# Patient Record
Sex: FEMALE | Race: NATIVE HAWAIIAN OR OTHER PACIFIC ISLANDER | NOT HISPANIC OR LATINO | Employment: PART TIME | URBAN - METROPOLITAN AREA
[De-identification: names, ages, dates, MRNs, and addresses within clinical notes are randomized per-mention and may not be internally consistent; named-entity substitution may affect disease eponyms.]

---

## 2019-02-23 ENCOUNTER — HOSPITAL ENCOUNTER (EMERGENCY)
Facility: MEDICAL CENTER | Age: 24
End: 2019-02-23
Attending: EMERGENCY MEDICINE
Payer: OTHER MISCELLANEOUS

## 2019-02-23 VITALS
DIASTOLIC BLOOD PRESSURE: 81 MMHG | HEART RATE: 95 BPM | WEIGHT: 182.98 LBS | TEMPERATURE: 97 F | OXYGEN SATURATION: 98 % | HEIGHT: 69 IN | SYSTOLIC BLOOD PRESSURE: 125 MMHG | RESPIRATION RATE: 16 BRPM | BODY MASS INDEX: 27.1 KG/M2

## 2019-02-23 DIAGNOSIS — S01.512A INTRAORAL LACERATION, INITIAL ENCOUNTER: ICD-10-CM

## 2019-02-23 PROCEDURE — 700101 HCHG RX REV CODE 250: Performed by: ORAL & MAXILLOFACIAL SURGERY

## 2019-02-23 PROCEDURE — 99284 EMERGENCY DEPT VISIT MOD MDM: CPT

## 2019-02-23 RX ORDER — LIDOCAINE HYDROCHLORIDE AND EPINEPHRINE BITARTRATE 20; .01 MG/ML; MG/ML
10 INJECTION, SOLUTION SUBCUTANEOUS ONCE
Status: COMPLETED | OUTPATIENT
Start: 2019-02-23 | End: 2019-02-23

## 2019-02-23 RX ADMIN — LIDOCAINE HYDROCHLORIDE AND EPINEPHRINE 10 ML: 20; 10 INJECTION, SOLUTION INFILTRATION; PERINEURAL at 14:24

## 2019-02-23 ASSESSMENT — LIFESTYLE VARIABLES: DO YOU DRINK ALCOHOL: NO
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
## 2019-02-23 NOTE — ED NOTES
Discharge instructions given to pt including follow up with oral surgeon or returning if no improvement of symptoms or to return if worse. Prescription x 1 provided to pt by Dr. Lin and instructed no driving no drinking while on prescribed pain medication. Questions answered by RN. Denies any new complaints. Discharged w/stable vitals and able to ambulate w/steady gait. Mouth laceration repaired by Dr. Lin.
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
## 2022-11-08 ENCOUNTER — HOSPITAL ENCOUNTER (OUTPATIENT)
Dept: RADIOLOGY | Facility: MEDICAL CENTER | Age: 27
End: 2022-11-08
Payer: COMMERCIAL

## 2022-11-08 ENCOUNTER — OFFICE VISIT (OUTPATIENT)
Dept: URGENT CARE | Facility: PHYSICIAN GROUP | Age: 27
End: 2022-11-08
Payer: COMMERCIAL

## 2022-11-08 VITALS
RESPIRATION RATE: 14 BRPM | HEIGHT: 67 IN | TEMPERATURE: 97.6 F | SYSTOLIC BLOOD PRESSURE: 122 MMHG | BODY MASS INDEX: 31.39 KG/M2 | HEART RATE: 102 BPM | OXYGEN SATURATION: 100 % | WEIGHT: 200 LBS | DIASTOLIC BLOOD PRESSURE: 76 MMHG

## 2022-11-08 DIAGNOSIS — J18.9 COMMUNITY ACQUIRED BILATERAL LOWER LOBE PNEUMONIA: ICD-10-CM

## 2022-11-08 DIAGNOSIS — R05.9 COUGH, UNSPECIFIED TYPE: ICD-10-CM

## 2022-11-08 PROCEDURE — 99214 OFFICE O/P EST MOD 30 MIN: CPT

## 2022-11-08 PROCEDURE — 71046 X-RAY EXAM CHEST 2 VIEWS: CPT

## 2022-11-08 RX ORDER — BENZONATATE 100 MG/1
100 CAPSULE ORAL 3 TIMES DAILY PRN
Qty: 60 CAPSULE | Refills: 0 | Status: SHIPPED | OUTPATIENT
Start: 2022-11-08 | End: 2023-03-06 | Stop reason: SDUPTHER

## 2022-11-08 RX ORDER — AMOXICILLIN 500 MG/1
1000 CAPSULE ORAL 3 TIMES DAILY
Qty: 30 CAPSULE | Refills: 0 | Status: SHIPPED | OUTPATIENT
Start: 2022-11-08 | End: 2022-11-13

## 2022-11-08 RX ORDER — AMOXICILLIN 500 MG/1
500 CAPSULE ORAL 3 TIMES DAILY
Qty: 15 CAPSULE | Refills: 0 | Status: SHIPPED | OUTPATIENT
Start: 2022-11-08 | End: 2022-11-08

## 2022-11-08 RX ORDER — AZITHROMYCIN 250 MG/1
TABLET, FILM COATED ORAL
Qty: 6 TABLET | Refills: 0 | Status: SHIPPED | OUTPATIENT
Start: 2022-11-08 | End: 2023-03-06

## 2022-11-08 ASSESSMENT — ENCOUNTER SYMPTOMS
SORE THROAT: 0
FEVER: 0
SHORTNESS OF BREATH: 1
WEIGHT LOSS: 0
COUGH: 1
DIAPHORESIS: 0
SINUS PAIN: 0
MYALGIAS: 0
HEMOPTYSIS: 0
SPUTUM PRODUCTION: 1
WHEEZING: 1
CHILLS: 0

## 2022-11-08 NOTE — PROGRESS NOTES
"Subjective:   Ruel Guajardo is a 27 y.o. female who presents for Cough      HPI: This is a 27-year-old female who presents today for cough.  Patient reports productive cough for \"months \".  She does report that she has chronic cough ever since having bilateral lobe pneumonia in 2012.  She does report wheezing and shortness of breath that has recently occurred over the last few months.  She reports symptoms are worse while lying down.  No history of smoking.  She does report history of childhood asthma.  She denies sick contacts.  No fevers, chills, body aches.  She has not taken anything for her symptoms.  She denies having regular PCP follow-up.      Review of Systems   Constitutional:  Negative for chills, diaphoresis, fever, malaise/fatigue and weight loss.   HENT:  Negative for congestion, ear discharge, ear pain, sinus pain and sore throat.    Respiratory:  Positive for cough, sputum production, shortness of breath and wheezing. Negative for hemoptysis.    Musculoskeletal:  Negative for myalgias.     Medications:    No current outpatient medications on file prior to visit.     No current facility-administered medications on file prior to visit.        Allergies:   Patient has no known allergies.    Problem List:   There is no problem list on file for this patient.       Surgical History:  No past surgical history on file.    Past Social Hx:   Social History     Tobacco Use    Smoking status: Passive Smoke Exposure - Never Smoker   Substance Use Topics    Alcohol use: Yes     Comment: sometimes    Drug use: No          Problem list, medications, and allergies reviewed by myself today in Epic.     Objective:     /76 (BP Location: Left arm, Patient Position: Sitting, BP Cuff Size: Adult)   Pulse (!) 102   Temp 36.4 °C (97.6 °F) (Temporal)   Resp 14   Ht 1.702 m (5' 7\")   Wt 90.7 kg (200 lb)   SpO2 100%   BMI 31.32 kg/m²     Physical Exam  Vitals and nursing note reviewed. "   Constitutional:       General: She is awake. She is not in acute distress.     Appearance: Normal appearance. She is well-developed and normal weight. She is not ill-appearing, toxic-appearing or diaphoretic.   HENT:      Head: Normocephalic and atraumatic.      Right Ear: Tympanic membrane, ear canal and external ear normal. There is no impacted cerumen.      Left Ear: Tympanic membrane, ear canal and external ear normal. There is no impacted cerumen.      Nose: Nose normal. No congestion or rhinorrhea.      Mouth/Throat:      Mouth: Mucous membranes are moist.      Pharynx: Oropharynx is clear. No oropharyngeal exudate or posterior oropharyngeal erythema.   Cardiovascular:      Rate and Rhythm: Regular rhythm. Tachycardia present.      Pulses: Normal pulses.      Heart sounds: Normal heart sounds. No murmur heard.    No friction rub. No gallop.   Pulmonary:      Effort: Pulmonary effort is normal. No respiratory distress.      Breath sounds: No stridor. Wheezing present. No rhonchi or rales.   Chest:      Chest wall: No tenderness.   Musculoskeletal:      Cervical back: Neck supple. No tenderness.   Lymphadenopathy:      Cervical: No cervical adenopathy.   Skin:     General: Skin is warm and dry.      Capillary Refill: Capillary refill takes less than 2 seconds.   Neurological:      General: No focal deficit present.      Mental Status: She is alert and oriented to person, place, and time. Mental status is at baseline.      Cranial Nerves: No cranial nerve deficit.      Motor: No weakness.      Gait: Gait normal.   Psychiatric:         Mood and Affect: Mood normal.         Behavior: Behavior normal. Behavior is cooperative.         Thought Content: Thought content normal.         Judgment: Judgment normal.       Assessment/Plan:     Diagnosis and associated orders:   1. Community acquired bilateral lower lobe pneumonia  DX-CHEST-2 VIEWS    azithromycin (ZITHROMAX) 250 MG Tab    benzonatate (TESSALON) 100 MG Cap     amoxicillin (AMOXIL) 500 MG Cap    DISCONTINUED: amoxicillin (AMOXIL) 500 MG Cap       DX Chest:  IMPRESSION:  1.  Opacifications in the lower lungs are identified bilaterally, most prominent in the right middle lobe. Findings could be due to pneumonia.      Comments/MDM:   Pt is clinically stable at today's acute urgent care visit.  No acute distress noted. Appropriate for outpatient management at this time.     Patient is not ill or toxic appearing in clinic today.  Vital signs are stable, SPO2 100% on room air.  Dx chest showsOpacifications in the lower lungs are identified bilaterally, most prominent in the right middle lobe. Findings could be due to pneumonia.  I have personally reviewed x-rays.  Patient will be treated with amoxicillin 1000 mg TID x5 days and azithromycin 500 mg on day 1 then 250 mg for following days.  Have advised patient to begin taking antibiotics as prescribed, rest, increase oral hydration, use humidifier.  She is to return to  or go to ER for any new or worsening signs or symptoms, and if symptoms fail to improve in 48 hours. Urgent referral placed for PCP establishment.  Patient is agreeable and verbalizes good understanding of this today.  Work note provided.           Discussed DDx, management options (risks,benefits, and alternatives to planned treatment), natural progression and supportive care.  Expressed understanding and the treatment plan was agreed upon. Questions were encouraged and answered   Return to urgent care prn if new or worsening sx or if there is no improvement in condition prn.    Educated in Red flags and indications to immediately call 911 or present to the Emergency Department.   Advised the patient to follow-up with the primary care physician for recheck, reevaluation, and consideration of further management.    I personally reviewed prior external notes and test results pertinent to today's visit.  I have independently reviewed and interpreted all  diagnostics ordered during this urgent care acute visit.       Please note that this dictation was created using voice recognition software. I have made a reasonable attempt to correct obvious errors, but I expect that there are errors of grammar and possibly content that I did not discover before finalizing the note.    This note was electronically signed by REDD Mcclendon

## 2022-11-08 NOTE — LETTER
November 8, 2022    To Whom It May Concern:         This is confirmation that Ruel Crespo Tamekastefany attended her scheduled appointment with ISIAH Gonzalez on 11/08/22. Please excuse her from work 11/9/22-11/11/22.         If you have any questions please do not hesitate to call me at the phone number listed below.      Sincerely,          JOAQUIN Gonzalez.   Electronically signed  [phone]

## 2022-11-09 ENCOUNTER — TELEPHONE (OUTPATIENT)
Dept: SCHEDULING | Facility: IMAGING CENTER | Age: 27
End: 2022-11-09

## 2022-11-10 ENCOUNTER — OFFICE VISIT (OUTPATIENT)
Dept: MEDICAL GROUP | Facility: IMAGING CENTER | Age: 27
End: 2022-11-10
Payer: COMMERCIAL

## 2022-11-10 VITALS
TEMPERATURE: 98 F | SYSTOLIC BLOOD PRESSURE: 114 MMHG | RESPIRATION RATE: 16 BRPM | DIASTOLIC BLOOD PRESSURE: 70 MMHG | OXYGEN SATURATION: 98 % | HEART RATE: 85 BPM | BODY MASS INDEX: 31.52 KG/M2 | HEIGHT: 67 IN | WEIGHT: 200.8 LBS

## 2022-11-10 DIAGNOSIS — Z13.6 SCREENING FOR CARDIOVASCULAR CONDITION: ICD-10-CM

## 2022-11-10 DIAGNOSIS — Z13.79 GENETIC TESTING: ICD-10-CM

## 2022-11-10 DIAGNOSIS — E66.9 OBESITY (BMI 30-39.9): ICD-10-CM

## 2022-11-10 DIAGNOSIS — Z13.0 SCREENING FOR DEFICIENCY ANEMIA: ICD-10-CM

## 2022-11-10 DIAGNOSIS — J98.01 COUGH DUE TO BRONCHOSPASM: ICD-10-CM

## 2022-11-10 DIAGNOSIS — J45.20 MILD INTERMITTENT CHILDHOOD ASTHMA WITHOUT COMPLICATION: ICD-10-CM

## 2022-11-10 DIAGNOSIS — Z13.220 SCREENING FOR CHOLESTEROL LEVEL: ICD-10-CM

## 2022-11-10 DIAGNOSIS — Z13.1 DIABETES MELLITUS SCREENING: ICD-10-CM

## 2022-11-10 DIAGNOSIS — Z11.3 SCREENING EXAMINATION FOR SEXUALLY TRANSMITTED DISEASE: ICD-10-CM

## 2022-11-10 DIAGNOSIS — Z13.29 SCREENING FOR THYROID DISORDER: ICD-10-CM

## 2022-11-10 DIAGNOSIS — R05.3 CHRONIC COUGH: ICD-10-CM

## 2022-11-10 DIAGNOSIS — J18.9 PNEUMONIA OF BOTH LUNGS DUE TO INFECTIOUS ORGANISM, UNSPECIFIED PART OF LUNG: ICD-10-CM

## 2022-11-10 DIAGNOSIS — J45.20 INTERMITTENT ASTHMA WITHOUT COMPLICATION, UNSPECIFIED ASTHMA SEVERITY: ICD-10-CM

## 2022-11-10 PROBLEM — J45.909 CHILDHOOD ASTHMA WITHOUT COMPLICATION: Status: ACTIVE | Noted: 2022-11-10

## 2022-11-10 PROCEDURE — 99214 OFFICE O/P EST MOD 30 MIN: CPT

## 2022-11-10 RX ORDER — ALBUTEROL SULFATE 90 UG/1
2 AEROSOL, METERED RESPIRATORY (INHALATION) EVERY 4 HOURS PRN
Qty: 1 EACH | Refills: 11 | Status: SHIPPED | OUTPATIENT
Start: 2022-11-10 | End: 2023-12-05 | Stop reason: SDUPTHER

## 2022-11-10 ASSESSMENT — PAIN SCALES - GENERAL: PAINLEVEL: NO PAIN

## 2022-11-10 ASSESSMENT — PATIENT HEALTH QUESTIONNAIRE - PHQ9: CLINICAL INTERPRETATION OF PHQ2 SCORE: 0

## 2022-11-10 NOTE — PROGRESS NOTES
Chief Complaint   Patient presents with    Cough     Phlegmy cough,     Pneumonia     X2days, SOB        HISTORY OF THE PRESENT ILLNESS: Patient is a 27 y.o. female. This pleasant patient is here today to establish care and to discuss:    To establish care  No previous PCP    Pneumonia, bilateral lower lobes  Patient presented to  on 11/8 with symptoms of productive cough, shortness of breath, and wheezing. Patient was diagnosed with CAP, bilaterally and treated with Azithromycin and Amoxicillin. She continues to take her antibiotics as prescribed and is tolerating well without side effects.  Patient reports her symptoms are improving except her cough.  She is taking Tessalon Perles which does not help.   Denies fevers, chills, lethargy, fatigue, malaise, facial swelling, visual changes, weakness, elevated heart rate, stiff neck, prolonged cough, persistent wheezing, leg swelling, trouble breathing, persistent pain or pressure in the chest, confusion, inability to wake or stay awake, bluish lips or face, persistent tachycardia (fast heart rate), prolonged dizziness, or fainting.     Chronic cough  She admits that she has had chronic productive cough since 2012 after being hospitalized for bilateral pneumonia.  Patient states that she was told of having asthma during her middle school years but no further work up was done and did not require use of inhaler at the time.  She also has seasonal allergies and does not take any OTC antihistamine for this.     Obesity   Diet: admits that she does not eat as healthy as she should. She eats out regularly and drinks soda. She does not exercise; however, she stays active at work. She works at the [employer].   Patient admits to having family history of cardiovascular disease and diabetes.    Allergies: Patient has no known allergies.    Current Outpatient Medications Ordered in Epic   Medication Sig Dispense Refill    albuterol 108 (90 Base) MCG/ACT Aero Soln inhalation  "aerosol Inhale 2 Puffs every four hours as needed for Shortness of Breath. 1 Each 11    azithromycin (ZITHROMAX) 250 MG Tab Take 2 tablets by mouth on day one. Take one tablet by mouth daily for days 2-5 6 Tablet 0    benzonatate (TESSALON) 100 MG Cap Take 1 Capsule by mouth 3 times a day as needed for Cough. 60 Capsule 0    amoxicillin (AMOXIL) 500 MG Cap Take 2 Capsules by mouth 3 times a day for 5 days. 30 Capsule 0     No current Epic-ordered facility-administered medications on file.       History reviewed. No pertinent past medical history.    History reviewed. No pertinent surgical history.    Social History     Tobacco Use    Smoking status: Never     Passive exposure: Yes   Vaping Use    Vaping Use: Never used   Substance Use Topics    Alcohol use: Yes     Comment: sometimes    Drug use: No       Social History     Social History Narrative    Not on file       Family History   Problem Relation Age of Onset    Non-contributory Mother     Non-contributory Father     Heart Disease Other     Diabetes Other     Breast Cancer Neg Hx     Colorectal Cancer Neg Hx     Ovarian Cancer Neg Hx     Tubal Cancer Neg Hx     Peritoneal Cancer Neg Hx        ROS: per hpi    Gen: no fevers/chills  Pulm: no sob, no cough  CV: no chest pain, no palpitations, no edema  GI: no nausea/vomiting, no diarrhea  Skin: no rash    Exam: /70 (BP Location: Left arm, Patient Position: Sitting, BP Cuff Size: Adult)   Pulse 85   Temp 36.7 °C (98 °F) (Temporal)   Resp 16   Ht 1.702 m (5' 7\")   Wt 91.1 kg (200 lb 12.8 oz)   SpO2 98%  Body mass index is 31.45 kg/m².    Physical Exam:  Constitutional: Well-developed and well-nourished. Not diaphoretic. No distress.   Skin: Skin is warm and dry. No rash noted.  Head: Atraumatic without lesions.  Eyes: Conjunctivae and extraocular motions are normal. Pupils are equal, round, and reactive to light. No scleral icterus.   Ears:  External ears unremarkable. Tympanic membranes clear and " intact.  Nose: Nares patent. Septum midline. Turbinates without erythema nor edema. No discharge.   Mouth/Throat: Tongue normal. Oropharynx is clear and moist. Posterior pharynx without erythema or exudates.  Neck: Supple, trachea midline. Normal range of motion. No thyromegaly present. No lymphadenopathy--cervical or supraclavicular.  Cardiovascular: Regular rate and rhythm, S1 and S2 without murmur, rubs, or gallops.  Lungs: Normal inspiratory effort, diminished lung sounds with expiratory wheezing over bilateral posterior lower lung fields which improved after coughing. No rales or crackles.  Abdomen: Soft, non tender, and without distention. Active bowel sounds in all four quadrants. No rebound, guarding, masses or HSM.  Extremities: No cyanosis, clubbing, erythema, nor edema. Distal pulses intact and symmetric.   Musculoskeletal: All major joints AROM full in all directions without pain.  Neurological: Alert and oriented x 3. No cranial nerve deficit. 5/5 myotomes. Sensation intact.   Psychiatric:  Behavior, mood, and affect are appropriate.    Please note that this dictation was created using voice recognition software. I have made every reasonable attempt to correct obvious errors, but I expect that there are errors of grammar and possibly content that I did not discover before finalizing the note.      Assessment/Plan    27 y.o. female with the following -    1. Pneumonia of both lungs due to infectious organism, unspecified part of lung  New issue with symptoms improving.  Recommend to continue with antibiotics as prescribed.  Encouraged to increase fluid intake and plenty of rest. For worsening symptoms, advised to go to ER.    2. Cough due to bronchospasm  3. Chronic cough  5. Mild intermittent childhood asthma without complication  7. Intermittent asthma without complication, unspecified asthma severity  Established and ongoing condition.  Patient's presentation likely due to bronchospasm from asthma.   Discussed starting albuterol inhaler to use as needed for shortness of breath, wheezing, and coughing fits. Medication administration and side effects such as increased heart rate and nausea discussed.  Patient may continue to use Tessalon Perles as needed for cough.  Recommend PFT test to evaluate her asthma, patient agreeable to this.  Recommend for patient to start over-the-counter oral antihistamine such as Claritin, Allegra, or Zyrtec daily.  If symptoms do not improve, discussed starting inhaler with steroids for management of her asthma.  Patient agreeable to this.  ED symptoms discussed.    - albuterol 108 (90 Base) MCG/ACT Aero Soln inhalation aerosol; Inhale 2 Puffs every four hours as needed for Shortness of Breath.  Dispense: 1 Each; Refill: 11  - PULMONARY FUNCTION TESTS -Test requested: Complete Pulmonary Function Test; Future  - VITAMIN D,25 HYDROXY (DEFICIENCY); Future    4. Screening examination for sexually transmitted disease  Patient age high risk age group. She has not had STI screening in past. Patient agreeable to getting these done.    - Chlamydia/GC, PCR (Genital/Anal swab)  - HIV AG/AB Combo Assay Screening; Future  - T.Pallidum AB MARCELLUS (Screening); Future  - Trichomonas Vaginalis by TMA  - Hepatitis C Virus Antibody; Future  - HEP B Surface Antibody; Future  - Hep B Core AB Total; Future  - Hep B Surface Antigen; Future          6. Genetic testing    - Referral to Genetic Research Studies    8. Obesity (BMI 30-39.9)  9. Diabetes mellitus screening  10. Screening for cholesterol level  11. Screening for thyroid disorder  12. Screening for deficiency anemia  13. Screening for cardiovascular condition  Discussed lifestyle and dietary changes such as low-carb diet, high in vegetables and fresh fruits.  Encouraged to increase water intake.  Regular physical exercise at least 30 minutes/day 5 days a week. Weight reduction if applicable (aim for 5% to 10% body weight increments).   Patient with  family history of cardiovascular disease and diabetes.   Patient is at an increased risk for serious conditions such as heart disease, type 2 diabetes, BURTON, certain types of cancers, gallbladder disease, and circulation problems. Will order labs to rule out.      - CBC WITH DIFFERENTIAL; Future  - Comp Metabolic Panel; Future  - Lipid Profile; Future  - TSH WITH REFLEX TO FT4; Future  - VITAMIN D,25 HYDROXY (DEFICIENCY); Future  - HEMOGLOBIN A1C; Future    Medical Decision Making/Course:  In the course of preparing for this visit with review of the pertinent past medical history, recent and past clinic visits, current medications, and performing chart, immunization, medical history and medication reconciliation, and in the further course of obtaining the current history pertinent to the clinic visit today, performing an exam and evaluation, ordering and independently evaluating labs, tests, and/or procedures, prescribing any recommended new medications as noted above, providing any pertinent counseling and education and recommending further coordination of care. This was discussed with patient in a shared-decision making conversation, and they understand and agreed with plan of care.     Return in about 4 weeks (around 12/8/2022), or if symptoms worsen or fail to improve, for f/u labs.    Thank you, Carrol RAINEY  [facility]      Please note that this dictation was created using voice recognition software. I have made every reasonable attempt to correct obvious errors, but I expect that there are errors of grammar and possibly content that I did not discover before finalizing the note.

## 2023-03-06 ENCOUNTER — OFFICE VISIT (OUTPATIENT)
Dept: URGENT CARE | Facility: PHYSICIAN GROUP | Age: 28
End: 2023-03-06
Payer: COMMERCIAL

## 2023-03-06 ENCOUNTER — HOSPITAL ENCOUNTER (OUTPATIENT)
Dept: RADIOLOGY | Facility: MEDICAL CENTER | Age: 28
End: 2023-03-06
Payer: COMMERCIAL

## 2023-03-06 VITALS
WEIGHT: 200 LBS | HEART RATE: 105 BPM | HEIGHT: 67 IN | TEMPERATURE: 100 F | SYSTOLIC BLOOD PRESSURE: 128 MMHG | BODY MASS INDEX: 31.39 KG/M2 | DIASTOLIC BLOOD PRESSURE: 72 MMHG | RESPIRATION RATE: 14 BRPM | OXYGEN SATURATION: 96 %

## 2023-03-06 DIAGNOSIS — R50.9 FEVER, UNSPECIFIED FEVER CAUSE: ICD-10-CM

## 2023-03-06 DIAGNOSIS — J18.9 COMMUNITY ACQUIRED PNEUMONIA OF RIGHT MIDDLE LOBE OF LUNG: ICD-10-CM

## 2023-03-06 DIAGNOSIS — R06.2 WHEEZING: ICD-10-CM

## 2023-03-06 LAB
FLUAV RNA SPEC QL NAA+PROBE: NEGATIVE
FLUBV RNA SPEC QL NAA+PROBE: NEGATIVE
RSV RNA SPEC QL NAA+PROBE: NEGATIVE
SARS-COV-2 RNA RESP QL NAA+PROBE: NEGATIVE

## 2023-03-06 PROCEDURE — 71046 X-RAY EXAM CHEST 2 VIEWS: CPT

## 2023-03-06 PROCEDURE — 0241U POCT CEPHEID COV-2, FLU A/B, RSV - PCR: CPT

## 2023-03-06 PROCEDURE — 99214 OFFICE O/P EST MOD 30 MIN: CPT | Mod: 25,CS

## 2023-03-06 PROCEDURE — 94640 AIRWAY INHALATION TREATMENT: CPT

## 2023-03-06 RX ORDER — METHYLPREDNISOLONE 4 MG/1
TABLET ORAL
Qty: 21 TABLET | Refills: 0 | Status: SHIPPED | OUTPATIENT
Start: 2023-03-06 | End: 2023-03-16

## 2023-03-06 RX ORDER — AMOXICILLIN AND CLAVULANATE POTASSIUM 875; 125 MG/1; MG/1
1 TABLET, FILM COATED ORAL 2 TIMES DAILY
Qty: 14 TABLET | Refills: 0 | Status: SHIPPED | OUTPATIENT
Start: 2023-03-06 | End: 2023-03-13

## 2023-03-06 RX ORDER — DOXYCYCLINE HYCLATE 100 MG
100 TABLET ORAL 2 TIMES DAILY
Qty: 14 TABLET | Refills: 0 | Status: SHIPPED | OUTPATIENT
Start: 2023-03-06 | End: 2023-03-13

## 2023-03-06 RX ORDER — ALBUTEROL SULFATE 2.5 MG/3ML
2.5 SOLUTION RESPIRATORY (INHALATION) ONCE
Status: COMPLETED | OUTPATIENT
Start: 2023-03-06 | End: 2023-03-06

## 2023-03-06 RX ORDER — BENZONATATE 100 MG/1
100 CAPSULE ORAL 3 TIMES DAILY PRN
Qty: 60 CAPSULE | Refills: 0 | Status: SHIPPED | OUTPATIENT
Start: 2023-03-06 | End: 2023-12-05 | Stop reason: SDUPTHER

## 2023-03-06 RX ADMIN — ALBUTEROL SULFATE 2.5 MG: 2.5 SOLUTION RESPIRATORY (INHALATION) at 16:06

## 2023-03-06 NOTE — LETTER
March 6, 2023      To Whom It May Concern:    This is confirmation that Ruel Guajardo was seen in urgent care today for illness. She may return to work on Thursday 03/09/23. If you have any questions please do not hesitate to call me at the phone number listed below.    Sincerely,      Keysha Elaine, [email or web address].  [phone]

## 2023-03-06 NOTE — PROGRESS NOTES
"Subjective:   Ruel Guajardo is a 27 y.o. female who presents for Headache (Migraine for a couple days, hands and feet are cold. Chest feels tight which causes phlegm-y cough. Hard to eat because it feels tight going down. Body aches, some aches on L side of chest. )      HPI:    Patient presents to urgent care with her SO for concerns of recurrent pneumonia  Reports chest pain, fever, headache, body aches, sore throat.  Endorses productive cough  Denies dizziness, SOB  Symptoms started 2-3 days ago.   Reports treatment for CAP in November. Had follow up with PCP  States similar feeling to pneumonia.   Denies smoking, smoke exposure.       ROS As above in HPI    Medications:    Current Outpatient Medications on File Prior to Visit   Medication Sig Dispense Refill    albuterol 108 (90 Base) MCG/ACT Aero Soln inhalation aerosol Inhale 2 Puffs every four hours as needed for Shortness of Breath. 1 Each 11     No current facility-administered medications on file prior to visit.        Allergies:   Peanut-derived    Problem List:   Patient Active Problem List   Diagnosis    Pneumonia of both lower lobes    Chronic cough    Childhood asthma without complication    Obesity (BMI 30-39.9)        Surgical History:  No past surgical history on file.    Past Social Hx:   Social History     Tobacco Use    Smoking status: Never     Passive exposure: Yes    Smokeless tobacco: Never   Vaping Use    Vaping Use: Never used   Substance Use Topics    Alcohol use: Yes     Comment: sometimes    Drug use: No          Problem list, medications, and allergies reviewed by myself today in Epic.     Objective:     /72   Pulse (!) 105   Temp 37.8 °C (100 °F)   Resp 14   Ht 1.702 m (5' 7\")   Wt 90.7 kg (200 lb)   SpO2 96%   BMI 31.32 kg/m²     Physical Exam  Vitals reviewed.   Constitutional:       Appearance: Normal appearance.   HENT:      Head: Normocephalic and atraumatic.      Right Ear: Tympanic membrane and ear " canal normal.      Left Ear: Tympanic membrane and ear canal normal.      Nose: Congestion and rhinorrhea present. Rhinorrhea is clear.      Right Sinus: No maxillary sinus tenderness or frontal sinus tenderness.      Left Sinus: No maxillary sinus tenderness or frontal sinus tenderness.      Mouth/Throat:      Mouth: Mucous membranes are moist.      Pharynx: Oropharynx is clear. Uvula midline. Posterior oropharyngeal erythema present. No pharyngeal swelling or oropharyngeal exudate.      Tonsils: No tonsillar exudate.   Cardiovascular:      Rate and Rhythm: Regular rhythm. Tachycardia present.      Heart sounds: Normal heart sounds. No murmur heard.    No friction rub. No gallop.   Pulmonary:      Effort: Pulmonary effort is normal. No respiratory distress.      Breath sounds: No stridor. Examination of the right-upper field reveals decreased breath sounds. Examination of the left-upper field reveals decreased breath sounds. Examination of the right-middle field reveals decreased breath sounds and wheezing. Examination of the left-middle field reveals decreased breath sounds and wheezing. Examination of the right-lower field reveals decreased breath sounds and wheezing. Examination of the left-lower field reveals decreased breath sounds and wheezing. Decreased breath sounds and wheezing present. No rhonchi or rales.   Chest:      Chest wall: No tenderness.   Abdominal:      General: Bowel sounds are normal.      Palpations: Abdomen is soft.   Skin:     General: Skin is warm and dry.      Capillary Refill: Capillary refill takes less than 2 seconds.      Findings: No rash.   Neurological:      Mental Status: She is alert and oriented to person, place, and time.       DX-CHEST-2 VIEWS 03/06/2023    Narrative  3/6/2023 3:49 PM    HISTORY/REASON FOR EXAM: Cough.    TECHNIQUE/EXAM DESCRIPTION AND NUMBER OF VIEWS:  Two views of the chest.    COMPARISON:  11/8/2022.    FINDINGS:  Cardiomediastinal contours are  normal.    There is new lingula greater than middle lobe consolidation and there is a new small left pleural effusion versus costophrenic sulcus blunting from atelectasis    No right pleural space process is evident.    Impression  New lingular greater than middle lobe consolidation is concerning for pneumonia. There is small left pleural fluid versus atelectasis      Assessment/Plan:       Results for orders placed or performed in visit on 03/06/23   POCT CEPHEID COV-2, FLU A/B, RSV - PCR   Result Value Ref Range    SARS-CoV-2 by PCR Negative Negative, Invalid    Influenza virus A RNA Negative Negative, Invalid    Influenza virus B, PCR Negative Negative, Invalid    RSV, PCR Negative Negative, Invalid       Diagnosis and associated orders:   1. Fever, unspecified fever cause  - DX-CHEST-2 VIEWS; Future  - POCT CEPHEID COV-2, FLU A/B, RSV - PCR    2. Wheezing  - albuterol (PROVENTIL) 2.5mg/3ml nebulizer solution 2.5 mg    3. Community acquired pneumonia of right middle lobe of lung  - benzonatate (TESSALON) 100 MG Cap; Take 1 Capsule by mouth 3 times a day as needed for Cough.  Dispense: 60 Capsule; Refill: 0  - amoxicillin-clavulanate (AUGMENTIN) 875-125 MG Tab; Take 1 Tablet by mouth 2 times a day for 7 days.  Dispense: 14 Tablet; Refill: 0  - doxycycline (VIBRAMYCIN) 100 MG Tab; Take 1 Tablet by mouth 2 times a day for 7 days.  Dispense: 14 Tablet; Refill: 0  - methylPREDNISolone (MEDROL DOSEPAK) 4 MG Tablet Therapy Pack; Follow schedule on package instructions.  Dispense: 21 Tablet; Refill: 0  - Referral to Pulmonary and Sleep Medicine        Comments/MDM:     Per radiology impression, there is New lingular greater than middle lobe consolidation is concerning for pneumonia. There is small left pleural fluid versus atelectasis.  Patient will be started on Augmentin and Doxycycline for CAP. She was previously treated with Amoxil and Azithromycin for CAP on 11/8/22. She reports she was compliant with medication  administration and completed both antibiotics previously. Patient SpO2 is 94% during triage, which increased to 96% after albuterol nebulized treatment. Wheezing continues to be auscultated s/p nebulized treatments. Patient reports improvement in ease of breathing. Declined repeated treatment in office. Supportive measures encouraged: Rest, increased oral hydration, NSAIDs/tylenol as needed per package instructions, warm humidification, deep breathing exercises.  Patient referred to pulmonary for recurrent CAP. Strict return to ER precautions reviewed with patient and her SO. Patient verbalized understanding and consented to plan of care.        Please note that this dictation was created using voice recognition software. I have made a reasonable attempt to correct obvious errors, but I expect that there are errors of grammar and possibly content that I did not discover before finalizing the note.    This note was electronically signed by Keysha Elaine DNP

## 2023-03-16 ENCOUNTER — OFFICE VISIT (OUTPATIENT)
Dept: SLEEP MEDICINE | Facility: MEDICAL CENTER | Age: 28
End: 2023-03-16
Attending: STUDENT IN AN ORGANIZED HEALTH CARE EDUCATION/TRAINING PROGRAM
Payer: COMMERCIAL

## 2023-03-16 VITALS
HEIGHT: 67 IN | HEART RATE: 92 BPM | DIASTOLIC BLOOD PRESSURE: 72 MMHG | OXYGEN SATURATION: 95 % | WEIGHT: 208 LBS | SYSTOLIC BLOOD PRESSURE: 104 MMHG | BODY MASS INDEX: 32.65 KG/M2

## 2023-03-16 DIAGNOSIS — J45.20 MILD INTERMITTENT ASTHMA WITHOUT COMPLICATION: ICD-10-CM

## 2023-03-16 PROCEDURE — 99204 OFFICE O/P NEW MOD 45 MIN: CPT | Performed by: STUDENT IN AN ORGANIZED HEALTH CARE EDUCATION/TRAINING PROGRAM

## 2023-03-16 PROCEDURE — 99212 OFFICE O/P EST SF 10 MIN: CPT | Performed by: STUDENT IN AN ORGANIZED HEALTH CARE EDUCATION/TRAINING PROGRAM

## 2023-03-16 RX ORDER — FLUTICASONE FUROATE 200 UG/1
1 POWDER RESPIRATORY (INHALATION) DAILY
Qty: 30 EACH | Refills: 3 | Status: SHIPPED | OUTPATIENT
Start: 2023-03-16 | End: 2023-06-15 | Stop reason: SDUPTHER

## 2023-03-16 ASSESSMENT — ENCOUNTER SYMPTOMS
VOMITING: 0
CHILLS: 0
SPUTUM PRODUCTION: 0
FOCAL WEAKNESS: 0
HEMOPTYSIS: 0
FEVER: 0
NAUSEA: 0
SHORTNESS OF BREATH: 0
EYE DISCHARGE: 0
COUGH: 0
WHEEZING: 0
FALLS: 0

## 2023-03-16 ASSESSMENT — PATIENT HEALTH QUESTIONNAIRE - PHQ9
SUM OF ALL RESPONSES TO PHQ QUESTIONS 1-9: 9
5. POOR APPETITE OR OVEREATING: 2 - MORE THAN HALF THE DAYS
CLINICAL INTERPRETATION OF PHQ2 SCORE: 1

## 2023-03-16 NOTE — PROGRESS NOTES
Pulmonary Clinic Note    Chief Complaint:  Chief Complaint   Patient presents with    Establish Care     Referred by Keysha RAINEY for  Community acquired pneumonia of right middle lobe of lung    Other     CXR 03/06/23     HPI:   Ruel Guajardo is a very pleasant 27 y.o. female with history of childhood asthma who presents to pulmonary clinic for asthma.    Patient states she has had asthma since her childhood and has always been managed w/PRN albuterol w/o any complications. She had a pneumonia in 2012 and was doing ok until 11/2022 when she was noted to have a viral vs bacterial pneumonia and asthma exacerbation that improved w/abx and albuterol. Then again seen on 3/6/23 w/fever, cough, sore throat, body aches and CXR was c/f pulmonary infiltrates so she was treated w/abx. She feels much better now. Doesn't use albuterol even when she feels dyspneic bc it sometimes makes her chest feel tight.   She reports dyspnea and wheezing w/exposure to cold weather, environmental allergies, significant exertion.   No significant exposures.   No history of recurrent infections.  Never been hospitalized or intubated for her asthma.    Past Medical History:   Diagnosis Date    Cough     Shortness of breath     Sputum production     Wheezing        History reviewed. No pertinent surgical history.    Social History     Socioeconomic History    Marital status: Single     Spouse name: Not on file    Number of children: Not on file    Years of education: Not on file    Highest education level: Not on file   Occupational History    Not on file   Tobacco Use    Smoking status: Never     Passive exposure: Never    Smokeless tobacco: Never   Vaping Use    Vaping Use: Never used   Substance and Sexual Activity    Alcohol use: Yes     Comment: sometimes    Drug use: No    Sexual activity: Yes     Partners: Male     Birth control/protection: Condom   Other Topics Concern    Not on file   Social History Narrative     "Not on file     Social Determinants of Health     Financial Resource Strain: Not on file   Food Insecurity: Not on file   Transportation Needs: Not on file   Physical Activity: Not on file   Stress: Not on file   Social Connections: Not on file   Intimate Partner Violence: Not on file   Housing Stability: Not on file          Family History   Problem Relation Age of Onset    Non-contributory Mother     Non-contributory Father     Heart Disease Other     Diabetes Other     Breast Cancer Neg Hx     Colorectal Cancer Neg Hx     Ovarian Cancer Neg Hx     Tubal Cancer Neg Hx     Peritoneal Cancer Neg Hx        Current Outpatient Medications on File Prior to Visit   Medication Sig Dispense Refill    benzonatate (TESSALON) 100 MG Cap Take 1 Capsule by mouth 3 times a day as needed for Cough. 60 Capsule 0    albuterol 108 (90 Base) MCG/ACT Aero Soln inhalation aerosol Inhale 2 Puffs every four hours as needed for Shortness of Breath. 1 Each 11     No current facility-administered medications on file prior to visit.       Allergies: Peanut-derived      ROS:   Review of Systems   Constitutional:  Negative for chills and fever.   HENT:  Negative for hearing loss.    Eyes:  Negative for discharge.   Respiratory:  Negative for cough, hemoptysis, sputum production, shortness of breath and wheezing.    Cardiovascular:  Negative for chest pain.   Gastrointestinal:  Negative for nausea and vomiting.   Musculoskeletal:  Negative for falls.   Skin:  Negative for rash.   Neurological:  Negative for focal weakness.     Vitals:  /72 (BP Location: Left arm, Patient Position: Sitting, BP Cuff Size: Adult)   Pulse 92   Ht 1.702 m (5' 7\")   Wt 94.3 kg (208 lb)   SpO2 95%     Physical Exam:  Physical Exam  Vitals and nursing note reviewed.   Constitutional:       General: She is not in acute distress.     Appearance: Normal appearance. She is not ill-appearing or toxic-appearing.   HENT:      Head: Normocephalic and atraumatic.      " Nose: Nose normal.      Mouth/Throat:      Mouth: Mucous membranes are moist.   Eyes:      General: No scleral icterus.     Conjunctiva/sclera: Conjunctivae normal.   Cardiovascular:      Rate and Rhythm: Normal rate and regular rhythm.   Pulmonary:      Effort: Pulmonary effort is normal. No respiratory distress.      Breath sounds: No wheezing, rhonchi or rales.   Abdominal:      Palpations: Abdomen is soft.   Musculoskeletal:         General: No deformity or signs of injury. Normal range of motion.      Cervical back: Normal range of motion.   Skin:     General: Skin is warm and dry.   Neurological:      General: No focal deficit present.      Mental Status: She is alert. Mental status is at baseline.   Psychiatric:         Mood and Affect: Mood normal.         Behavior: Behavior normal.       Data:    Reviewed     Assessment/Plan:    Problem List Items Addressed This Visit       Mild intermittent asthma without complication     MMRC 1. Last exacerbation in 11/2022.   Still w/sypmtoms but hesitant to use her albuterol bc it made her chest feel tight before when she was sick    - start ICS daily  - PRN albuterol to use as rescue inhaler  - PFTs  - CBC w/diff and IgE         Relevant Medications    Fluticasone Furoate (ARNUITY ELLIPTA) 200 MCG/ACT AEROSOL POWDER, BREATH ACTIVATED    Other Relevant Orders    IGE SERUM    CBC WITH DIFFERENTIAL    PULMONARY FUNCTION TESTS -Test requested: Complete Pulmonary Function Test       Return in about 3 months (around 6/16/2023).     This note was generated using voice recognition software which has a chance of producing errors of grammar and possibly content.  I have made every reasonable attempt to find and correct any obvious errors, but it should be expected that some may not be found prior to finalization of this note.    Time spent in record review prior to patient arrival, reviewing results, and in face-to-face encounter totaled 45 min, excluding any procedures if  performed.    Belkis Hua MD  Pulmonary and Critical Care Medicine  [facility]

## 2023-03-16 NOTE — ASSESSMENT & PLAN NOTE
MMRC 1. Last exacerbation in 11/2022.   Still w/sypmtoms but hesitant to use her albuterol bc it made her chest feel tight before when she was sick    - start ICS daily  - PRN albuterol to use as rescue inhaler  - PFTs  - CBC w/diff and IgE

## 2023-04-06 NOTE — PROGRESS NOTES
Pulmonary Clinic Note    Date of Visit: 4/19/2023     Chief Complaint:  No chief complaint on file.    HPI:   Ruel Guajardo is a very pleasant 27 y.o. year old female never smoker, with a PMHx of asthma, elevated BMI who presented to the Pulmonary Clinic for a regular follow up. Last seen in the office on 3/16/2023 with Dr. Hua.     Patient is followed by the pulmonary office for asthma.  PFTs in 2023 show ***.   Patient is a history of childhood asthma which has been managed by albuterol as needed and has not had any complications.  She did have pneumonia in 2012 and was stable until November 2022 when she was diagnosed with an asthma exacerbation with viral versus bacterial pneumonia that improved with antibiotics and albuterol.  She again was diagnosed with pneumonia in March 2023 by CXR with pulmonary infiltrates and was treated with antibiotics.  She reports triggers being cold weather, environmental allergies and significant exertion.    3/16/2023-  Patient was started on Arnuity and albuterol as needed.  IgE, CBC with differential and pulmonary function tests were ordered.    4/19/2023-  ***      Exacerbations this year:    Current medication regimen:    Oxygen use:    MMRC Grade:   0- Breathless only during strenuous exercise  1- Short of breath when hurrying or going up a small hill  2- Walks slower than friends due to breathlessness, has to stop at own pace  3- Stops to catch breath on level ground after 100m  4- Breathless with ambulating around house or ADLs        Past Medical History:   Diagnosis Date    Cough     Shortness of breath     Sputum production     Wheezing      No past surgical history on file.  Social History     Socioeconomic History    Marital status: Single     Spouse name: Not on file    Number of children: Not on file    Years of education: Not on file    Highest education level: Not on file   Occupational History    Not on file   Tobacco Use    Smoking status:  Never     Passive exposure: Never    Smokeless tobacco: Never   Vaping Use    Vaping Use: Never used   Substance and Sexual Activity    Alcohol use: Yes     Comment: sometimes    Drug use: No    Sexual activity: Yes     Partners: Male     Birth control/protection: Condom   Other Topics Concern    Not on file   Social History Narrative    Not on file     Social Determinants of Health     Financial Resource Strain: Not on file   Food Insecurity: Not on file   Transportation Needs: Not on file   Physical Activity: Not on file   Stress: Not on file   Social Connections: Not on file   Intimate Partner Violence: Not on file   Housing Stability: Not on file        Family History   Problem Relation Age of Onset    Non-contributory Mother     Non-contributory Father     Heart Disease Other     Diabetes Other     Breast Cancer Neg Hx     Colorectal Cancer Neg Hx     Ovarian Cancer Neg Hx     Tubal Cancer Neg Hx     Peritoneal Cancer Neg Hx      Current Outpatient Medications on File Prior to Visit   Medication Sig Dispense Refill    Fluticasone Furoate (ARNUITY ELLIPTA) 200 MCG/ACT AEROSOL POWDER, BREATH ACTIVATED Inhale 1 Puff every day. Rinse mouth after each use. 30 Each 3    benzonatate (TESSALON) 100 MG Cap Take 1 Capsule by mouth 3 times a day as needed for Cough. 60 Capsule 0    albuterol 108 (90 Base) MCG/ACT Aero Soln inhalation aerosol Inhale 2 Puffs every four hours as needed for Shortness of Breath. 1 Each 11     No current facility-administered medications on file prior to visit.     Allergies: Peanut-derived    ROS:   ROS    Vitals:  There were no vitals taken for this visit.    Physical Exam:  Physical Exam    Laboratory Data:  Labs: ***       PFTs: (Date: 4/12/2023)-  ***    Impression:  ***    CXR: (Date: 3/6/2023)-  Impression:  New lingular greater than middle lobe consolidation is concerning for pneumonia. There is small left pleural fluid versus atelectasis      Assessment and Plan:    Problem List Items  Addressed This Visit    None      Diagnostic studies have been reviewed with the patient.    No follow-ups on file.     This note was generated using voice recognition software which has a chance of producing errors of grammar and possibly content.  I have made every reasonable attempt to find and correct any obvious errors, but it should be expected that some may not be found prior to finalization of this note.    Time spent in record review prior to patient arrival, reviewing results, and in face-to-face encounter totaled *** min.  __________  REDD Smith  Pulmonary Medicine  [facility]

## 2023-04-12 ENCOUNTER — APPOINTMENT (OUTPATIENT)
Dept: SLEEP MEDICINE | Facility: MEDICAL CENTER | Age: 28
End: 2023-04-12
Attending: STUDENT IN AN ORGANIZED HEALTH CARE EDUCATION/TRAINING PROGRAM
Payer: COMMERCIAL

## 2023-04-19 ENCOUNTER — APPOINTMENT (OUTPATIENT)
Dept: SLEEP MEDICINE | Facility: MEDICAL CENTER | Age: 28
End: 2023-04-19
Payer: COMMERCIAL

## 2023-05-11 NOTE — PROGRESS NOTES
Pulmonary Clinic Note    Date of Visit: 5/11/2023     Chief Complaint:  No chief complaint on file.    HPI:   Ruel Guajardo is a very pleasant 27 y.o. year old female never smoker, with a PMHx of asthma, elevated BMI who presented to the Pulmonary Clinic for a regular follow up. Last seen in the office on 3/16/2023 with Dr. Hua.     Patient is followed by the pulmonary office for asthma.  There are no PFTs on file.  She currently uses albuterol.  In November 2022 where she was treated for viral versus bacterial pneumonia and asthma exacerbation.  Her most recent exacerbation was in March 2023 where she was treated with antibiotics for CXR that showed pulmonary infiltrates.  ***      Exacerbations this year:    Current medication regimen:    Oxygen use:    MMRC Grade:   0- Breathless only during strenuous exercise  1- Short of breath when hurrying or going up a small hill  2- Walks slower than friends due to breathlessness, has to stop at own pace  3- Stops to catch breath on level ground after 100m  4- Breathless with ambulating around house or ADLs    PFT ??  LABS??      Past Medical History:   Diagnosis Date    Cough     Shortness of breath     Sputum production     Wheezing      No past surgical history on file.  Social History     Socioeconomic History    Marital status: Single     Spouse name: Not on file    Number of children: Not on file    Years of education: Not on file    Highest education level: Not on file   Occupational History    Not on file   Tobacco Use    Smoking status: Never     Passive exposure: Never    Smokeless tobacco: Never   Vaping Use    Vaping Use: Never used   Substance and Sexual Activity    Alcohol use: Yes     Comment: sometimes    Drug use: No    Sexual activity: Yes     Partners: Male     Birth control/protection: Condom   Other Topics Concern    Not on file   Social History Narrative    Not on file     Social Determinants of Health     Financial Resource Strain:  Not on file   Food Insecurity: Not on file   Transportation Needs: Not on file   Physical Activity: Not on file   Stress: Not on file   Social Connections: Not on file   Intimate Partner Violence: Not on file   Housing Stability: Not on file        Family History   Problem Relation Age of Onset    Non-contributory Mother     Non-contributory Father     Heart Disease Other     Diabetes Other     Breast Cancer Neg Hx     Colorectal Cancer Neg Hx     Ovarian Cancer Neg Hx     Tubal Cancer Neg Hx     Peritoneal Cancer Neg Hx      Current Outpatient Medications on File Prior to Visit   Medication Sig Dispense Refill    Fluticasone Furoate (ARNUITY ELLIPTA) 200 MCG/ACT AEROSOL POWDER, BREATH ACTIVATED Inhale 1 Puff every day. Rinse mouth after each use. 30 Each 3    benzonatate (TESSALON) 100 MG Cap Take 1 Capsule by mouth 3 times a day as needed for Cough. 60 Capsule 0    albuterol 108 (90 Base) MCG/ACT Aero Soln inhalation aerosol Inhale 2 Puffs every four hours as needed for Shortness of Breath. 1 Each 11     No current facility-administered medications on file prior to visit.     Allergies: Peanut-derived    ROS:   ROS    Vitals:  There were no vitals taken for this visit.    Physical Exam:  Physical Exam    Laboratory Data:  PFTs-  None on file    CXR: (Date: 3/6/2023)-  Impression:  New lingular greater than middle lobe consolidation is concerning for pneumonia. There is small left pleural fluid versus atelectasis      Assessment and Plan:    Problem List Items Addressed This Visit    None      Diagnostic studies have been reviewed with the patient.    No follow-ups on file.     This note was generated using voice recognition software which has a chance of producing errors of grammar and possibly content.  I have made every reasonable attempt to find and correct any obvious errors, but it should be expected that some may not be found prior to finalization of this note.    Time spent in record review prior to patient  arrival, reviewing results, and in face-to-face encounter totaled *** min.  __________  REDD Smith  Pulmonary Medicine  [facility]

## 2023-05-25 ENCOUNTER — APPOINTMENT (OUTPATIENT)
Dept: SLEEP MEDICINE | Facility: MEDICAL CENTER | Age: 28
End: 2023-05-25
Payer: COMMERCIAL

## 2023-05-31 ENCOUNTER — NON-PROVIDER VISIT (OUTPATIENT)
Dept: SLEEP MEDICINE | Facility: MEDICAL CENTER | Age: 28
End: 2023-05-31
Attending: STUDENT IN AN ORGANIZED HEALTH CARE EDUCATION/TRAINING PROGRAM
Payer: COMMERCIAL

## 2023-05-31 VITALS — WEIGHT: 211.9 LBS | HEIGHT: 68 IN | BODY MASS INDEX: 32.12 KG/M2

## 2023-05-31 DIAGNOSIS — J45.20 MILD INTERMITTENT ASTHMA WITHOUT COMPLICATION: ICD-10-CM

## 2023-05-31 PROCEDURE — 94060 EVALUATION OF WHEEZING: CPT | Performed by: STUDENT IN AN ORGANIZED HEALTH CARE EDUCATION/TRAINING PROGRAM

## 2023-05-31 PROCEDURE — 94060 EVALUATION OF WHEEZING: CPT | Mod: 26 | Performed by: INTERNAL MEDICINE

## 2023-05-31 PROCEDURE — 94726 PLETHYSMOGRAPHY LUNG VOLUMES: CPT | Performed by: STUDENT IN AN ORGANIZED HEALTH CARE EDUCATION/TRAINING PROGRAM

## 2023-05-31 PROCEDURE — 94726 PLETHYSMOGRAPHY LUNG VOLUMES: CPT | Mod: 26 | Performed by: INTERNAL MEDICINE

## 2023-05-31 PROCEDURE — 94729 DIFFUSING CAPACITY: CPT | Performed by: STUDENT IN AN ORGANIZED HEALTH CARE EDUCATION/TRAINING PROGRAM

## 2023-05-31 PROCEDURE — 94729 DIFFUSING CAPACITY: CPT | Mod: 26 | Performed by: INTERNAL MEDICINE

## 2023-05-31 ASSESSMENT — PULMONARY FUNCTION TESTS
FEV1/FVC_PERCENT_PREDICTED: 93
FVC: 3.92
FEV1/FVC_PERCENT_PREDICTED: 85
FEV1/FVC: 80
FEV1_PREDICTED: 3.13
FEV1/FVC_PERCENT_CHANGE: 6
FEV1/FVC_PERCENT_PREDICTED: 93
FEV1_PERCENT_PREDICTED: 106
FVC_PREDICTED: 3.68
FEV1/FVC: 80
FEV1: 3.34
FVC_PERCENT_PREDICTED: 106
FEV1_PERCENT_PREDICTED: 99
FEV1/FVC_PERCENT_PREDICTED: 100
FEV1/FVC_PERCENT_PREDICTED: 99
FEV1/FVC_PERCENT_LLN: 71
FVC_LLN: 3.07
FEV1_LLN: 2.61
FEV1/FVC_PREDICTED: 86
FVC: 3.91
FEV1_PERCENT_CHANGE: 6
FEV1/FVC: 85
FEV1/FVC: 85.2
FEV1_PERCENT_CHANGE: 0
FEV1: 3.13
FVC_PERCENT_PREDICTED: 106

## 2023-05-31 NOTE — PROCEDURES
Tech: Yara Jose, RT  Good patient effort & cooperation.  Test was performed on the [employer] Body Plethysmograph- Elite DX system.  The predicted sets used for Spirometry are GLI-2012, for Lung Volumes are ITS, and for DLCO is GLI 2017.  The results of this test meet the ATS standards for acceptability and repeatability.  The DLCO was uncorrected for Hb.  A bronchodilator of Ventolin HFA 2 puffs via spacer was administered.  DLCO was performed during dilation period.    Interpretation:   Baseline spirometry shows normal airflows.  There is significant bronchodilator response.  Total lung capacity is normal however there is mild air trapping.  Diffusion capacity is elevated at 132% predicted.  Normal pulmonary function testing although air trapping with bronchodilator responsiveness and elevated DLCO is consistent with stated diagnosis of asthma.

## 2023-06-15 ENCOUNTER — OFFICE VISIT (OUTPATIENT)
Dept: SLEEP MEDICINE | Facility: MEDICAL CENTER | Age: 28
End: 2023-06-15
Attending: NURSE PRACTITIONER
Payer: COMMERCIAL

## 2023-06-15 VITALS
WEIGHT: 211 LBS | RESPIRATION RATE: 16 BRPM | HEIGHT: 67 IN | HEART RATE: 86 BPM | SYSTOLIC BLOOD PRESSURE: 114 MMHG | OXYGEN SATURATION: 98 % | DIASTOLIC BLOOD PRESSURE: 76 MMHG | BODY MASS INDEX: 33.12 KG/M2

## 2023-06-15 DIAGNOSIS — J45.20 MILD INTERMITTENT ASTHMA WITHOUT COMPLICATION: ICD-10-CM

## 2023-06-15 DIAGNOSIS — G47.30 SLEEP DISORDER BREATHING: ICD-10-CM

## 2023-06-15 PROCEDURE — 99212 OFFICE O/P EST SF 10 MIN: CPT | Performed by: NURSE PRACTITIONER

## 2023-06-15 PROCEDURE — 99214 OFFICE O/P EST MOD 30 MIN: CPT | Performed by: NURSE PRACTITIONER

## 2023-06-15 PROCEDURE — 3078F DIAST BP <80 MM HG: CPT | Performed by: NURSE PRACTITIONER

## 2023-06-15 PROCEDURE — 3074F SYST BP LT 130 MM HG: CPT | Performed by: NURSE PRACTITIONER

## 2023-06-15 RX ORDER — FLUTICASONE FUROATE 200 UG/1
1 POWDER RESPIRATORY (INHALATION) DAILY
Qty: 30 EACH | Refills: 3 | Status: SHIPPED | OUTPATIENT
Start: 2023-06-15 | End: 2023-12-05 | Stop reason: SDUPTHER

## 2023-06-15 NOTE — PROGRESS NOTES
Chief Complaint   Patient presents with    Follow-Up    Asthma       HPI:  Ruel Guajardo is a 27 y.o. year old female here today for follow-up on asthma with PFT results.  Last seen 3/16/2023 by Dr Hua.  Visit initially referred for CAP in right middle lobe of lung. Patient states she has had asthma since her childhood and has always been managed w/PRN albuterol w/o any complications. She had a pneumonia in 2012 and was doing ok until 11/2022 when she was noted to have a viral vs bacterial pneumonia and asthma exacerbation that improved w/abx and albuterol. Then again seen on 3/6/23 w/fever, cough, sore throat, body aches and CXR was c/f pulmonary infiltrates so she was treated w/abx.     She was started on Arnuity and using albuterol as needed.  Order was placed for PFTs and a CBC with differential and IgE.  Patient did not have blood work completed yet.  She states that she noticed improvement in her symptoms when starting immediately.  She is not frequently needing her rescue inhaler at this time.  She denies any current cough, wheezing, chest tightness, or new or worsening dyspnea or shortness of breath.  She does get dyspneic with exertion while at work.  She does work for [employer].    Also of significance, patient does note poor sleep quality, snoring, waking up gasping, and excessive daytime sleepiness and fatigue.  She has never tested for sleep apnea and would like to move forward with testing and referral.    PFT (5/31/2023):  Baseline spirometry shows normal airflows.  There is significant bronchodilator response.  Total lung capacity is normal however there is mild air trapping.  Diffusion capacity is elevated at 132% predicted.  Normal pulmonary function testing although air trapping with bronchodilator responsiveness and elevated DLCO is consistent with stated diagnosis of asthma.    ROS: As per HPI and otherwise negative if not stated.    Past Medical History:   Diagnosis Date    Cough   "   Shortness of breath     Sputum production     Wheezing        No past surgical history on file.    Family History   Problem Relation Age of Onset    Non-contributory Mother     Non-contributory Father     Heart Disease Other     Diabetes Other     Breast Cancer Neg Hx     Colorectal Cancer Neg Hx     Ovarian Cancer Neg Hx     Tubal Cancer Neg Hx     Peritoneal Cancer Neg Hx        Allergies as of 06/15/2023 - Reviewed 06/15/2023   Allergen Reaction Noted    Peanut-derived Anaphylaxis 03/06/2023        Vitals:  /76   Pulse 86   Resp 16   Ht 1.702 m (5' 7\")   Wt 95.7 kg (211 lb)   SpO2 98%     Current medications as of today   Current Outpatient Medications   Medication Sig Dispense Refill    Fluticasone Furoate (ARNUITY ELLIPTA) 200 MCG/ACT AEROSOL POWDER, BREATH ACTIVATED Inhale 1 Puff every day. Rinse mouth after each use. 30 Each 3    benzonatate (TESSALON) 100 MG Cap Take 1 Capsule by mouth 3 times a day as needed for Cough. 60 Capsule 0    albuterol 108 (90 Base) MCG/ACT Aero Soln inhalation aerosol Inhale 2 Puffs every four hours as needed for Shortness of Breath. 1 Each 11     No current facility-administered medications for this visit.         Physical Exam:   Gen:           Alert and oriented, No apparent distress. Mood and affect appropriate, normal interaction with examiner.  Eyes:          PERRL, EOM intact, sclere white, conjunctive moist.  Ears:          Not examined.   Hearing:     Grossly intact.  Nose:          Normal, no lesions or deformities.  Dentition:    Good dentition.  Oropharynx:   Tongue normal, posterior pharynx without erythema or exudate.  Neck:        Supple, trachea midline, no masses.  Respiratory Effort: No intercostal retractions or use of accessory muscles.   Lung Auscultation:      Clear to auscultation bilaterally; no rales, rhonchi or wheezing.  CV:            Regular rate and rhythm. No murmurs, rubs or gallops.  Abd:           Not examined.   Lymphadenopathy: Not " examined.  Gait and Station: Normal.  Digits and Nails: No clubbing, cyanosis, petechiae, or nodes.   Cranial Nerves: II-XII grossly intact.  Skin:        No rashes, lesions or ulcers noted.               Ext:           No cyanosis or edema.      Assessment:  1. Mild intermittent asthma without complication  Overnight Home Sleep Study    Fluticasone Furoate (ARNUITY ELLIPTA) 200 MCG/ACT AEROSOL POWDER, BREATH ACTIVATED      2. Sleep disorder breathing  Overnight Home Sleep Study    Referral to Pulmonary and Sleep Medicine          Plan:  Continue Arnuity.  Reviewed PFTs with patient.  Recommend continuing albuterol use as needed.  Patient will follow-up in 6 months.  Complains of poor sleep quality, snoring, waking up gasping, and daytime sleepiness and fatigue.  Will order home sleep study and refer to sleep clinic for evaluation.    Please note that this dictation was created using voice recognition software. I have made every reasonable attempt to correct obvious errors, but it is possible there are errors of grammar and possibly content that I did not discover before finalizing the note.

## 2023-06-24 ENCOUNTER — TELEPHONE (OUTPATIENT)
Dept: SCHEDULING | Facility: IMAGING CENTER | Age: 28
End: 2023-06-24
Payer: COMMERCIAL

## 2023-06-24 ENCOUNTER — TELEPHONE (OUTPATIENT)
Dept: HEALTH INFORMATION MANAGEMENT | Facility: OTHER | Age: 28
End: 2023-06-24
Payer: COMMERCIAL

## 2023-07-07 ENCOUNTER — HOME STUDY (OUTPATIENT)
Dept: SLEEP MEDICINE | Facility: MEDICAL CENTER | Age: 28
End: 2023-07-07
Attending: NURSE PRACTITIONER
Payer: COMMERCIAL

## 2023-07-07 DIAGNOSIS — G47.30 SLEEP DISORDER BREATHING: ICD-10-CM

## 2023-07-07 DIAGNOSIS — J45.20 MILD INTERMITTENT ASTHMA WITHOUT COMPLICATION: ICD-10-CM

## 2023-07-07 PROCEDURE — 95800 SLP STDY UNATTENDED: CPT | Performed by: STUDENT IN AN ORGANIZED HEALTH CARE EDUCATION/TRAINING PROGRAM

## 2023-07-19 NOTE — PROCEDURES
DIAGNOSTIC HOME SLEEP TEST (HST) REPORT WatchPAT      PATIENT ID:  NAME:  Ruel Guajardo  MRN:               [id number]  YOB: 1995  DATE OF STUDY: 7/10/2023      Impression:     This study shows evidence of:      1. Mild to Moderate obstructive sleep apnea with PAT apnea hypopnea index(pAHI) of 8.1 per hour.  PAT respiratory disturbance index (pRDI) was 16.2 per hour. These findings are based on 7 channels recording of PAT signal with sleep staging, heart rate, pulse oximetry, actigraphy, body position, snoring and respiratory movement.     2. Oxygenation O2 Sat. mean O2 sat was 96%,  kevon was 89%,  and maximum O2 at 99 %. O2 sat was at or  below 88% for 0 min of evaluation time. Oxygen Desaturation (>=4%) Index was 3/hr. AVG HR was 69 BPM.      TECHNICAL DESCRIPTION: Patient underwent home sleep apnea testing with peripheral arterial tone signal (WatchPAT™). This is a Type IV portable monitor and device per Medicare. Monitoring was done with 7 channels recording of PAT signal with sleep staging, heart rate, pulse oximetry, actigraphy, body position, snoring and respiratory movement. Prior to using the device, the patient received verbal and written instructions for its application and was provided with the help desk phone number for additional telephonic instruction with 24-hour availability of qualified personnel to answer questions.    Respiratory events:    pRDI: Total 64 (REM index 27.5/hr. NREM index 12.5/hr, All Night 16.2/hr)    3% pAHI: Total 32 (REM index 18.3/hr. NREM index 4.7/hr, All Night 8.1/hr)    3% pAHIc: Total 0 ( All Night 0/hr)    4% pAHI: Total 10 (All Night 2.5/hr)    General sleep summary: . Total recording time is 5 hours and 25 minutes and total Sleep time is 4 hours and 27 minutes. The patient spent 231 minutes in the supine position and 32.5 minutes in the nonsupine position.      Recommendations:    1. CPAP titration study vs Auto CPAP trial .   2.   In  general patients with sleep apnea are advised to avoid alcohol and sedatives and to not operate a motor vehicle while drowsy. In some cases alternative treatment options may prove effective in resolving sleep apnea in these options include upper airway surgery, the use of a dental orthotic or weight loss and positional therapy. Clinical correlation is required.         Bj Duke MD

## 2023-08-01 ENCOUNTER — OCCUPATIONAL MEDICINE (OUTPATIENT)
Dept: URGENT CARE | Facility: PHYSICIAN GROUP | Age: 28
End: 2023-08-01
Payer: COMMERCIAL

## 2023-08-01 ENCOUNTER — HOSPITAL ENCOUNTER (OUTPATIENT)
Dept: RADIOLOGY | Facility: MEDICAL CENTER | Age: 28
End: 2023-08-01
Attending: NURSE PRACTITIONER
Payer: COMMERCIAL

## 2023-08-01 VITALS
BODY MASS INDEX: 31.39 KG/M2 | OXYGEN SATURATION: 98 % | RESPIRATION RATE: 18 BRPM | SYSTOLIC BLOOD PRESSURE: 118 MMHG | WEIGHT: 200 LBS | DIASTOLIC BLOOD PRESSURE: 60 MMHG | TEMPERATURE: 97.2 F | HEART RATE: 87 BPM | HEIGHT: 67 IN

## 2023-08-01 DIAGNOSIS — S97.81XA CRUSHING INJURY OF RIGHT FOOT AND TOE, INITIAL ENCOUNTER: ICD-10-CM

## 2023-08-01 DIAGNOSIS — S97.101A CRUSHING INJURY OF RIGHT FOOT AND TOE, INITIAL ENCOUNTER: ICD-10-CM

## 2023-08-01 DIAGNOSIS — T36.95XA ANTIBIOTIC-INDUCED YEAST INFECTION: ICD-10-CM

## 2023-08-01 DIAGNOSIS — S92.501B OPEN FRACTURE OF PHALANX OF RIGHT FIFTH TOE, INITIAL ENCOUNTER: ICD-10-CM

## 2023-08-01 DIAGNOSIS — S91.114A LACERATION OF LESSER TOE OF RIGHT FOOT WITHOUT FOREIGN BODY PRESENT OR DAMAGE TO NAIL, INITIAL ENCOUNTER: ICD-10-CM

## 2023-08-01 DIAGNOSIS — B37.9 ANTIBIOTIC-INDUCED YEAST INFECTION: ICD-10-CM

## 2023-08-01 PROCEDURE — 73660 X-RAY EXAM OF TOE(S): CPT | Mod: RT

## 2023-08-01 PROCEDURE — 3078F DIAST BP <80 MM HG: CPT | Performed by: NURSE PRACTITIONER

## 2023-08-01 PROCEDURE — 3074F SYST BP LT 130 MM HG: CPT | Performed by: NURSE PRACTITIONER

## 2023-08-01 PROCEDURE — 99213 OFFICE O/P EST LOW 20 MIN: CPT | Mod: 29 | Performed by: NURSE PRACTITIONER

## 2023-08-01 RX ORDER — DOXYCYCLINE HYCLATE 100 MG
100 TABLET ORAL 2 TIMES DAILY
Qty: 10 TABLET | Refills: 0 | Status: SHIPPED | OUTPATIENT
Start: 2023-08-01 | End: 2023-08-06

## 2023-08-01 RX ORDER — FLUCONAZOLE 150 MG/1
TABLET ORAL
Qty: 2 TABLET | Refills: 0 | Status: SHIPPED | OUTPATIENT
Start: 2023-08-01 | End: 2023-12-05

## 2023-08-01 NOTE — PROGRESS NOTES
"Subjective:     Ruel Guajardo is a 27 y.o. female who presents for Laceration (Worker Comp New Rt pinky toe laceration)      DOI: 8/1/23 SHELLY: She was carrying a box and unfortunately the contents of the box which was a metal sheet did fall out of the box and hit the top of her right toe.  She states she immediately had pain.  She reports that there is now a laceration to the bottom of her right toe.  She states there is currently numbness and tingling.  Decreased range of motion.  She denies any other injury.  She denies any history of injury..  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    PMH:   No pertinent past medical history to this problem  MEDS:  Medications were reviewed in EMR  ALLERGIES:  Allergies were reviewed in EMR  FH:   No pertinent family history to this problem       Objective:     /60 (BP Location: Left arm, Patient Position: Sitting, BP Cuff Size: Adult)   Pulse 87   Temp 36.2 °C (97.2 °F) (Temporal)   Resp 18   Ht 1.702 m (5' 7\")   Wt 90.7 kg (200 lb)   SpO2 98%   BMI 31.32 kg/m²     Right foot: There is no swelling ecchymosis or bony tenderness to the right foot.  Right fifth toe there is swelling tenderness and tingling on the dorsal aspect.  The pulp of the right toe there is a laceration with irregular edges.  Actively bleeding. Laceration was cleansed, closed using steri strips.     Assessment/Plan:     Nontoxic-appearing 27-year-old female presenting clinic with a workplace injury crush injury of the right fifth toe as well as a laceration.  Did obtain x-ray which unfortunately did show a tuft fracture of the right fifth toe.  Distal neurovascular status grossly intact.  Laceration was cleansed copiously and closed with benzoin and Steri-Strips.  Patient did tolerate well.  Due to the location of the laceration being on the bottom of the toe did make patient nonweightbearing status.      1. Crushing injury of right foot and toe, initial encounter  - " DX-TOE(S) 2+ RIGHT; Future    2. Open fracture of phalanx of right fifth toe, initial encounter  - doxycycline (VIBRAMYCIN) 100 MG Tab; Take 1 Tablet by mouth 2 times a day for 5 days.  Dispense: 10 Tablet; Refill: 0    3. Laceration of lesser toe of right foot without foreign body present or damage to nail, initial encounter    4. Antibiotic-induced yeast infection  - fluconazole (DIFLUCAN) 150 MG tablet; Take one tablet orally for yeast infection, if symptoms persist, may repeat treatment after 72 hours.  Dispense: 2 Tablet; Refill: 0    Released to Restricted Duty FROM 8/1/2023 TO 8/6/2023  No use to right foot, must use crutches   Nondisplaced distal tuft fracture right 5th distal phalanx.    Differential diagnosis, natural history, supportive care, and indications for immediate follow-up discussed.

## 2023-08-01 NOTE — LETTER
[facility]  [address] [facility] [place] [address]  Phone:  [phone] - Fax:  [phone]   [facility] Progress Report and Disability Certification  Date of Service: 8/1/2023   No Show:  No  Date / Time of Next Visit: 8/6/2023   Claim Information   Patient Name: Ruel Guajardo  Claim Number:     Employer: [employer]  Date of Injury: 8/1/2023     Insurer / TPA: [employer] [place]  ID / SSN:     Occupation:   Diagnosis: Diagnoses of Crushing injury of right foot and toe, initial encounter, Open fracture of phalanx of right fifth toe, initial encounter, and Laceration of lesser toe of right foot without foreign body present or damage to nail, initial encounter were pertinent to this visit.    Medical Information   Related to Industrial Injury? Yes    Subjective Complaints:  DOI: 8/1/23 SHELLY: She was carrying a box and unfortunately the contents of the box which was a metal sheet did fall out of the box and hit the top of her right toe.  She states she immediately had pain.  She reports that there is now a laceration to the bottom of her right toe.  She states there is currently numbness and tingling.  Decreased range of motion.  She denies any other injury.  She denies any history of injury..  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   Objective Findings: Right foot: There is no swelling ecchymosis or bony tenderness to the right foot.  Right fifth toe there is swelling tenderness and tingling on the dorsal aspect.  The pulp of the right toe there is a laceration with irregular edges.  Actively bleeding. Laceration was cleansed, closed using steri strips.    Pre-Existing Condition(s):     Assessment:   Initial Visit    Status: Additional Care Required  Permanent Disability:No    Plan: Medication  Comments:antibioitcs for open fracture    Diagnostics: X-ray    Comments:  Nondisplaced distal tuft fracture right 5th distal phalanx.     Disability Information   Status: Released to Restricted Duty    From:  8/1/2023  Through: 8/6/2023 Restrictions are: Temporary   Physical Restrictions   Sitting:    Standing:    Stooping:    Bending:      Squatting:    Walking:    Climbing:    Pushing:      Pulling:    Other:    Reaching Above Shoulder (L):   Reaching Above Shoulder (R):       Reaching Below Shoulder (L):    Reaching Below Shoulder (R):      Not to exceed Weight Limits   Carrying(hrs):   Weight Limit(lb):   Lifting(hrs):   Weight  Limit(lb):     Comments: No use to right foot, must use crutches     Repetitive Actions   Hands: i.e. Fine Manipulations from Grasping:     Feet: i.e. Operating Foot Controls:     Driving / Operate Machinery:     Health Care Provider’s Original or Electronic Signature  JOSE ARMANDO ParrishRSnowNSnow Health Care Provider’s Original or Electronic Signature    Romel Tidwell DO MPH     Clinic Name / Location: [address] [address] Clinic Phone Number: Dept: [phone]   Appointment Time: 8:05 Am Visit Start Time: 8:33 AM   Check-In Time:  8:29 Am Visit Discharge Time:  10:20 AM    Original-Treating Physician or Chiropractor    Page 2-Insurer/TPA    Page 3-Employer    Page 4-Employee

## 2023-08-01 NOTE — LETTER
"EMPLOYEE’S CLAIM FOR COMPENSATION/ REPORT OF INITIAL TREATMENT  FORM C-4  PLEASE TYPE OR PRINT    EMPLOYEE’S CLAIM - PROVIDE ALL INFORMATION REQUESTED   First Name  Ruel Last Name  Bennett Birthdate                    1995                Sex  female Claim Number (Insurer’s Use Only)   Home Address  [address] Age  27 y.o. Height  1.702 m (5' 7\") Weight  90.7 kg (200 lb) [facility]     [facility] [place] Zip  [address] Telephone  [phone] (home)    Mailing Address  [address] [facility] Zip  [address] Primary Language Spoken  English    INSURER   THIRD-PARTY     Mindi Sands   Employee's Occupation (Job Title) When Injury or Occupational Disease Occurred      Employer's Name/Company Name  [employer]  Telephone      Office Mail Address (Number and Street)  [address] [facility].        Date of Injury  8/1/2023               Hours Injury  7:00 AM Date Employer Notified  8/1/2023 Last Day of Work after Injury or Occupational Disease  8/1/2023 Supervisor to Whom Injury     Reported  Mayra   Address or Location of Accident (if applicable)  Work [1]   What were you doing at the time of accident? (if applicable)  Carrying Irregs    How did this injury or occupational disease occur? (Be specific and answer in detail. Use additional sheet if necessary)  I went to lift an irreg, it had toppled over so as I straightened it out, the long, metal item inside came through the bottom of the box and sliced through my right foot. Injuring my pinky toe   If you believe that you have an occupational disease, when did you first have knowledge of the disability and its relationship to your employment?   Witnesses to the Accident (if applicable)  None      Nature of Injury or Occupational Disease  Workers' Compensation  Part(s) of Body Injured or Affected  Foot (R), Toe (R), N/A    I " CERTIFY THAT THE ABOVE IS TRUE AND CORRECT TO T HE BEST OF MY KNOWLEDGE AND THAT I HAVE PROVIDED THIS INFORMATION IN ORDER TO OBTAIN THE BENEFITS OF [place], OR CHAPTER 617 [place]).  I HEREBY AUTHORIZE ANY PHYSICIAN, CHIROPRACTOR, SURGEON, PRACTITIONER OR ANY OTHER PERSON, ANY HOSPITAL, INCLUDING [facility] OR [facility], ANY  MEDICAL SERVICE ORGANIZATION, ANY INSURANCE COMPANY, OR OTHER INSTITUTION OR ORGANIZATION TO RELEASE TO EACH OTHER, ANY MEDICAL OR OTHER INFORMATION, INCLUDING BENEFITS PAID OR PAYABLE, PERTINENT TO THIS INJURY OR DISEASE, EXCEPT INFORMATION RELATIVE TO DIAGNOSIS, TREATMENT AND/OR COUNSELING FOR AIDS, PSYCHOLOGICAL CONDITIONS, ALCOHOL OR CONTROLLED SUBSTANCES, FOR WHICH I MUST GIVE SPECIFIC AUTHORIZATION.  A PHOTOSTAT OF THIS AUTHORIZATION SHALL BE VALID AS THE ORIGINAL.       Date   Place Employee’s Original or  *Electronic Signature   THIS REPORT MUST BE COMPLETED AND MAILED WITHIN 3 WORKING DAYS OF TREATMENT   Place  [facility]  Name of Facility  [place]   Date  8/1/2023 Diagnosis and Description of Injury or Occupational Disease  (S97.81XA,  S97.101A) Crushing injury of right foot and toe, initial encounter  (S92.501B) Open fracture of phalanx of right fifth toe, initial encounter  (S91.114A) Laceration of lesser toe of right foot without foreign body present or damage to nail, initial encounter Is there evidence that the injured employee was under the influence of alcohol and/or another controlled substance at the time of accident?  ? No ? Yes (if yes, please explain)   Hour  8:33 AM   Diagnoses of Crushing injury of right foot and toe, initial encounter, Open fracture of phalanx of right fifth toe, initial encounter, and Laceration of lesser toe of right foot without foreign body present or damage to nail, initial encounter were pertinent to this visit. No    Treatment  Wound care, xray , work restrictions  Have you advised the patient to remain off work five days or     more?    X-Ray Findings  Negative   ? Yes Indicate dates:   From   To      From information given by the employee, together with medical evidence, can        you directly connect this injury or occupational disease as job incurred?  Yes ? No If no, is the injured employee capable of:  ? full duty  No ? modified duty  Yes   Is additional medical care by a physician indicated?  Yes If modified duty, specify any limitations / restrictions  See d-39   Do you know of any previous injury or disease contributing to this condition or occupational disease?  ? Yes ? No (Explain if yes)                          No   Date  8/1/2023 Print Health Care Provider's  Name  ISIAH Parrish I certify that the employer’s copy of  this form was delivered to the employer on:   Address  [address] Insurer’s Use Only     [facility] Zip  [address]    Provider’s Tax ID Number  [id number] Telephone  Dept: [phone]             Health Care Provider’s Original or Electronic Signature  e-SignGENPHILIP GAINES.P.R.NSnow Degree (MD,DO, DC,PA-C,APRN)  APRN      * Complete and attach Release of Information (Form C-4A) when injured employee signs C-4 Form electronically  ORIGINAL - TREATING HEALTHCARE PROVIDER PAGE 2 - INSURER/TPA PAGE 3 - EMPLOYER PAGE 4 - EMPLOYEE             Form C-4 (rev.08/21)           BRIEF DESCRIPTION OF RIGHTS AND BENEFITS  (Pursuant to [place] 616C.050)    Notice of Injury or Occupational Disease (Incident Report Form C-1): If an injury or occupational disease (OD) arises out of and in the course of employment, you must provide written notice to your employer as soon as practicable, but no later than 7 days after the accident or OD. Your employer shall maintain a sufficient supply of the required forms.    Claim for Compensation (Form C-4): If medical treatment is sought, the form C-4 is  "available at the place of initial treatment. A completed \"Claim for Compensation\" (Form C-4) must be filed within 90 days after an accident or OD. The treating physician or chiropractor must, within 3 working days after treatment, complete and mail to the employer, the employer's insurer and third-party , the Claim for Compensation.    Medical Treatment: If you require medical treatment for your on-the-job injury or OD, you may be required to select a physician or chiropractor from a list provided by your workers’ compensation insurer, if it has contracted with an Organization for Managed Care (MCO) or Preferred Provider Organization (PPO) or providers of health care. If your employer has not entered into a contract with an MCO or PPO, you may select a physician or chiropractor from the Panel of Physicians and Chiropractors. Any medical costs related to your industrial injury or OD will be paid by your insurer.    Temporary Total Disability (TTD): If your doctor has certified that you are unable to work for a period of at least 5 consecutive days, or 5 cumulative days in a 20-day period, or places restrictions on you that your employer does not accommodate, you may be entitled to TTD compensation.    Temporary Partial Disability (TPD): If the wage you receive upon reemployment is less than the compensation for TTD to which you are entitled, the insurer may be required to pay you TPD compensation to make up the difference. TPD can only be paid for a maximum of 24 months.    Permanent Partial Disability (PPD): When your medical condition is stable and there is an indication of a PPD as a result of your injury or OD, within 30 days, your insurer must arrange for an evaluation by a rating physician or chiropractor to determine the degree of your PPD. The amount of your PPD award depends on the date of injury, the results of the PPD evaluation, your age and wage.    Permanent Total Disability (PTD): If you " are medically certified by a treating physician or chiropractor as permanently and totally disabled and have been granted a PTD status by your insurer, you are entitled to receive monthly benefits not to exceed 66 2/3% of your average monthly wage. The amount of your PTD payments is subject to reduction if you previously received a lump-sum PPD award.    Vocational Rehabilitation Services: You may be eligible for vocational rehabilitation services if you are unable to return to the job due to a permanent physical impairment or permanent restrictions as a result of your injury or occupational disease.    Transportation and Per José Miguel Reimbursement: You may be eligible for travel expenses and per josé miguel associated with medical treatment.    Reopening: You may be able to reopen your claim if your condition worsens after claim closure.     Appeal Process: If you disagree with a written determination issued by the insurer or the insurer does not respond to your request, you may appeal to the Department of Administration, , by following the instructions contained in your determination letter. You must appeal the determination within 70 days from the date of the determination letter at [address], [address], [place], [place] [address], or [address], [address], [place] [address]. If you disagree with the  decision, you may appeal to the Department of Administration, . You must file your appeal within 30 days from the date of the  decision letter at [address], [address], [place], [place] [address], or [address], [address], [place], [place] [address]. If you disagree with a decision of an , you may file a petition for judicial review with the District Court. You must do so within 30 days of the Appeal Officer’s decision. You may be represented by an  at your own expense or you may contact the [facility] for  possible representation.    [place]  for Injured Workers (NAIW): If you disagree with a  decision, you may request that NAIW represent you without charge at an  Hearing. For information regarding denial of benefits, you may contact the NAIW at: [address], [address], [place] [address], [phone]) [phone], or [address], [address], [place] [address], [phone]    To File a Complaint with the Division: If you wish to file a complaint with the  of the Division of Industrial Relations (DIR),  please contact the Workers’ Compensation Section, [address], [address], [place], [place] [address], telephone [phone], or [address], [address], [place], [place] [address], telephone [phone].    For assistance with Workers’ Compensation Issues: You may contact the [facility] Office for Consumer Health Assistance, [address], [address], [place] [address], Toll Free 1-311.299.6693, Web site: http://[facility].[email or web address].gov/Programs/JUDSON E-mail: [email or web address]              __________________________________________________________________                                    _________________            Employee Name / Signature                                                                                                                            Date                                                                                                                                                                                                                              D-2 (rev. 10/20)

## 2023-08-06 ENCOUNTER — OCCUPATIONAL MEDICINE (OUTPATIENT)
Dept: URGENT CARE | Facility: PHYSICIAN GROUP | Age: 28
End: 2023-08-06
Payer: COMMERCIAL

## 2023-08-06 VITALS
RESPIRATION RATE: 14 BRPM | HEART RATE: 99 BPM | TEMPERATURE: 98 F | SYSTOLIC BLOOD PRESSURE: 122 MMHG | OXYGEN SATURATION: 96 % | WEIGHT: 200 LBS | BODY MASS INDEX: 31.39 KG/M2 | DIASTOLIC BLOOD PRESSURE: 82 MMHG | HEIGHT: 67 IN

## 2023-08-06 DIAGNOSIS — S92.534D: ICD-10-CM

## 2023-08-06 DIAGNOSIS — S91.114D LACERATION OF FIFTH TOE OF RIGHT FOOT, SUBSEQUENT ENCOUNTER: ICD-10-CM

## 2023-08-06 DIAGNOSIS — S97.101D CRUSHING INJURY OF TOE OF RIGHT FOOT, SUBSEQUENT ENCOUNTER: ICD-10-CM

## 2023-08-06 PROCEDURE — 99213 OFFICE O/P EST LOW 20 MIN: CPT | Performed by: NURSE PRACTITIONER

## 2023-08-06 PROCEDURE — 3079F DIAST BP 80-89 MM HG: CPT | Performed by: NURSE PRACTITIONER

## 2023-08-06 PROCEDURE — 3074F SYST BP LT 130 MM HG: CPT | Performed by: NURSE PRACTITIONER

## 2023-08-06 ASSESSMENT — VISUAL ACUITY: OU: 1

## 2023-08-06 ASSESSMENT — ENCOUNTER SYMPTOMS
CONSTITUTIONAL NEGATIVE: 1
NEUROLOGICAL NEGATIVE: 1

## 2023-08-06 NOTE — PROGRESS NOTES
"Subjective:     Ruel Guajardo is a 27 y.o. female who presents for Other (WC F/U: Toe is , a little numb, still bleeding. )    Initial UC visit 8/1/2023 reviewed for continuity of care:    \"DOI: 8/1/23 SHELLY: She was carrying a box and unfortunately the contents of the box which was a metal sheet did fall out of the box and hit the top of her right toe.  She states she immediately had pain.  She reports that there is now a laceration to the bottom of her right toe.  She states there is currently numbness and tingling.  Decreased range of motion.  She denies any other injury.  She denies any history of injury..  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 Dx: Right 5th toe crushing injury, fracture, and laceration. Tx: Doxycycline, restrictions, NWB with crutches.    Follow-up UC visit today 8/6/2023:    Condition gradually improving.  Went to [facility] urgent care the day after the initial visit.  Placed in walking boot.  No other/new symptoms.    Review of Systems   Constitutional: Negative.    Musculoskeletal:         Per HPI   Neurological: Negative.    All other systems reviewed and are negative.    Refer to HPI for additional details.    During this visit, appropriate PPE was worn and hand hygiene was performed.    PMH: No pertinent past medical history to this problem  MEDS: Medications were reviewed in Epic  ALLERGIES: Allergies were reviewed in Epic  SOCHX: Works as a  at [employer]   FH: No pertinent family history to this problem      Objective:     /82   Pulse 99   Temp 36.7 °C (98 °F)   Resp 14   Ht 1.702 m (5' 7\")   Wt 90.7 kg (200 lb)   SpO2 96%   BMI 31.32 kg/m²     Physical Exam  Nursing note reviewed.   Constitutional:       General: She is not in acute distress.     Appearance: She is well-developed. She is not ill-appearing or toxic-appearing.   Eyes:      General: Vision grossly intact.   Cardiovascular:      Rate and " Rhythm: Normal rate.   Pulmonary:      Effort: Pulmonary effort is normal. No respiratory distress.   Musculoskeletal:         General: No deformity. Normal range of motion.      Right foot: Normal range of motion and normal capillary refill. Laceration (Plantar aspect of R 5th toe, healing well, no S/S of infection) and tenderness (Right 5th toe) present. No swelling or deformity.   Skin:     General: Skin is warm and dry.      Capillary Refill: Capillary refill takes less than 2 seconds.      Coloration: Skin is not pale.      Findings: No erythema.   Neurological:      Mental Status: She is alert and oriented to person, place, and time.      Motor: No weakness.      Gait: Gait abnormal (Antalgic, using boot).   Psychiatric:         Behavior: Behavior normal. Behavior is cooperative.       Assessment/Plan:     1. Open nondisplaced fracture of distal phalanx of lesser toe of right foot with routine healing, subsequent encounter  - Referral to Podiatry    2. Crushing injury of toe of right foot, subsequent encounter  - Referral to Podiatry    3. Laceration of fifth toe of right foot, subsequent encounter  - Referral to Podiatry    Condition stable.  Continue basic wound care.  Ibuprofen as needed for pain.  Update work restrictions.  Continue to use walking boot.  Follow-up in 5 days.  Monitor.  Seek immediate medical attention if symptoms change/worsen.     Podiatry referral pending.    Differential diagnosis, natural history, supportive care, over-the-counter symptom management per 's instructions, close monitoring, and indications for immediate follow-up discussed.     All questions answered. Patient agrees with the plan of care.

## 2023-08-06 NOTE — LETTER
"   [facility] [place] [address]  Phone:  [phone] - Fax:  [phone]   [facility] Progress Report and Disability Certification  Date of Service: 8/6/2023   No Show:  No  Date / Time of Next Visit: 8/11/2023 @ 9 am   Claim Information   Patient Name: Ruel Guajardo  Claim Number:     Employer: [employer]  Date of Injury: 8/1/2023     Insurer / TPA: Mindi Skow[place]gan  ID / SSN:     Occupation:   Diagnosis: Diagnoses of Open nondisplaced fracture of distal phalanx of lesser toe of right foot with routine healing, subsequent encounter, Crushing injury of toe of right foot, subsequent encounter, and Laceration of fifth toe of right foot, subsequent encounter were pertinent to this visit.    Medical Information   Related to Industrial Injury? Yes    Subjective Complaints:  Initial UC visit 8/1/2023 reviewed for continuity of care:    \"DOI: 8/1/23 SHELLY: She was carrying a box and unfortunately the contents of the box which was a metal sheet did fall out of the box and hit the top of her right toe.  She states she immediately had pain.  She reports that there is now a laceration to the bottom of her right toe.  She states there is currently numbness and tingling.  Decreased range of motion.  She denies any other injury.  She denies any history of injury..  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 Dx: Right 5th toe crushing injury, fracture, and laceration. Tx: Doxycycline, restrictions, NWB with crutches.    Follow-up UC visit today 8/6/2023:    Condition gradually improving.  Went to [facility] urgent care the day after the initial visit.  Placed in walking boot.  No other/new symptoms.   Objective Findings: Constitutional:       General: She is not in acute distress.     Appearance: She is well-developed. She is not ill-appearing or toxic-appearing.   Musculoskeletal:         General: No " deformity. Normal range of motion.      Right foot: Normal range of motion and normal capillary refill. Laceration (Plantar aspect of R 5th toe, healing well, no S/S of infection) and tenderness (Right 5th toe) present. No swelling or deformity.   Skin:     General: Skin is warm and dry.      Capillary Refill: Capillary refill takes less than 2 seconds.      Coloration: Skin is not pale.      Findings: No erythema.   Neurological:      Mental Status: She is alert and oriented to person, place, and time.      Motor: No weakness.      Gait: Gait abnormal (Antalgic, using boot).    Pre-Existing Condition(s):     Assessment:   Condition Improved    Status: Additional Care Required  Permanent Disability:No    Plan: Consultation    Diagnostics:      Comments:  Condition stable.  Continue basic wound care.  Ibuprofen as needed for pain.  Update work restrictions.  Continue to use walking boot.  Follow-up in 5 days.  Monitor.  Seek immediate medical attention if symptoms change/worsen.     Disability Information   Status: Released to Restricted Duty    From:  2023  Through: 2023 Restrictions are: Temporary   Physical Restrictions   Sitting:    Standing:    Stooping:    Bending:      Squatting:    Walking:  < or = to 4 hrs/day Climbin hrs/day Pushing:      Pulling:    Other:    Reaching Above Shoulder (L):   Reaching Above Shoulder (R):       Reaching Below Shoulder (L):    Reaching Below Shoulder (R):      Not to exceed Weight Limits   Carrying(hrs):   Weight Limit(lb):   Lifting(hrs):   Weight  Limit(lb):     Comments: Must wear walking boot    Repetitive Actions   Hands: i.e. Fine Manipulations from Grasping:     Feet: i.e. Operating Foot Controls:     Driving / Operate Machinery:     Health Care Provider’s Original or Electronic Signature  ISIAH Lopez Health Care Provider’s Original or Electronic Signature    Romel Tidwell DO MPH     Clinic Name / Location: [place] [address]  [place] [address] Clinic Phone Number: Dept: [phone]   Appointment Time: 11:00 Am Visit Start Time: 11:29 AM   Check-In Time:  10:48 Am Visit Discharge Time: 12:20 PM   Original-Treating Physician or Chiropractor    Page 2-Insurer/TPA    Page 3-Employer    Page 4-Employee

## 2023-12-04 ENCOUNTER — APPOINTMENT (OUTPATIENT)
Dept: MEDICAL GROUP | Facility: IMAGING CENTER | Age: 28
End: 2023-12-04
Payer: COMMERCIAL

## 2023-12-05 ENCOUNTER — OFFICE VISIT (OUTPATIENT)
Dept: MEDICAL GROUP | Facility: IMAGING CENTER | Age: 28
End: 2023-12-05
Payer: COMMERCIAL

## 2023-12-05 VITALS
HEIGHT: 68 IN | DIASTOLIC BLOOD PRESSURE: 80 MMHG | OXYGEN SATURATION: 97 % | BODY MASS INDEX: 33.49 KG/M2 | SYSTOLIC BLOOD PRESSURE: 110 MMHG | WEIGHT: 221 LBS | HEART RATE: 84 BPM | TEMPERATURE: 97.7 F

## 2023-12-05 DIAGNOSIS — J45.20 MILD INTERMITTENT ASTHMA WITHOUT COMPLICATION: ICD-10-CM

## 2023-12-05 DIAGNOSIS — Z02.89 ENCOUNTER TO OBTAIN EXCUSE FROM WORK: ICD-10-CM

## 2023-12-05 DIAGNOSIS — Z11.4 SCREENING FOR HIV (HUMAN IMMUNODEFICIENCY VIRUS): ICD-10-CM

## 2023-12-05 DIAGNOSIS — Z23 NEED FOR VACCINATION: ICD-10-CM

## 2023-12-05 DIAGNOSIS — E66.9 OBESITY (BMI 30-39.9): ICD-10-CM

## 2023-12-05 DIAGNOSIS — Z00.00 WELLNESS EXAMINATION: ICD-10-CM

## 2023-12-05 DIAGNOSIS — Z11.59 NEED FOR HEPATITIS C SCREENING TEST: ICD-10-CM

## 2023-12-05 DIAGNOSIS — J98.01 COUGH DUE TO BRONCHOSPASM: ICD-10-CM

## 2023-12-05 PROCEDURE — 90471 IMMUNIZATION ADMIN: CPT

## 2023-12-05 PROCEDURE — 90686 IIV4 VACC NO PRSV 0.5 ML IM: CPT

## 2023-12-05 PROCEDURE — 3074F SYST BP LT 130 MM HG: CPT

## 2023-12-05 PROCEDURE — 3079F DIAST BP 80-89 MM HG: CPT

## 2023-12-05 PROCEDURE — 99395 PREV VISIT EST AGE 18-39: CPT | Mod: 25

## 2023-12-05 RX ORDER — FLUTICASONE FUROATE 200 UG/1
1 POWDER RESPIRATORY (INHALATION) DAILY
Qty: 30 EACH | Refills: 3 | Status: SHIPPED | OUTPATIENT
Start: 2023-12-05

## 2023-12-05 RX ORDER — BENZONATATE 100 MG/1
100 CAPSULE ORAL 3 TIMES DAILY PRN
Qty: 60 CAPSULE | Refills: 0 | Status: SHIPPED | OUTPATIENT
Start: 2023-12-05

## 2023-12-05 RX ORDER — ALBUTEROL SULFATE 90 UG/1
2 AEROSOL, METERED RESPIRATORY (INHALATION) EVERY 4 HOURS PRN
Qty: 1 EACH | Refills: 11 | Status: SHIPPED | OUTPATIENT
Start: 2023-12-05

## 2023-12-05 NOTE — LETTER
[facility] KELI  [facility]  [address] [place] [address]     December 5, 2023    Patient: Ruel Guajardo   YOB: 1995   Date of Visit: 12/5/2023       To Whom It May Concern:    Ruel Guajardo was seen and treated in our department on 12/5/2023. Please excuse Ruel from work on 11/29 through 12/5 due to her condition. She may return to work on 12/6.    Sincerely,     JOAQUIN Pool.

## 2023-12-05 NOTE — PROGRESS NOTES
Subjective:     CC:   Chief Complaint   Patient presents with    Annual Exam     Pt reported lung problems coming back   Pt report with breathing it hurts her back       HPI:   Ruel Guajardo is a 28 y.o. female who presents for annual exam. She is feeling well and denies any complaints.    Encounter to obtain excuse from work  Patient is requesting excuse from work note due to exposure to head lice. Patient used OTC medication for this. Her symptoms have resolved. She is asymptomatic.   However, she missed work from -.  Her employer is requesting a return to work note as well.     Mild-intermittent asthma  Recurrent pneumonia  Acute on chronic condition. Patient currently on Arnuity and albuterol PRN. She is requesting med refill.  She is managed by pulmonary and is scheduled to follow-up next week.     Ob-Gyn/ History:    Patient has GYN provider:   /Para:  0  Last Pap Smear:  no. no history of abnormal pap smears.  Gyn Surgery:  no.  Current Contraceptive Method:  no. yes currently sexually active.  Last menstrual period:  2023.  Periods regular. moderate bleeding. Cramping is moderate, severe.   She does take OTC analgesics for cramps.  No significant bloating/fluid retention, pelvic pain, or dyspareunia. No vaginal discharge  Post-menopausal bleeding: no  Urinary incontinence: no  Folate intake: no     Health Maintenance  Cholesterol Screening: ordered   Diabetes Screening: ordered   Diet: admits could improve   Exercise: daily, cardio and lifting at work   Substance Abuse: no   Safe in relationship. yes   Seat belts, bike helmet, gun safety discussed.  Sun protection used.  Dentist: yes  Eye Doctor: n/a    Cancer screening  Cervical Cancer Screening: due, instructed to schedule an appointment in office for this    Infectious disease screening/Immunizations  --STI Screening: due   --Practices safe sex.  --HIV Screening: due   --Hepatitis C Screening: due   --Immunizations: up  to date       She  has a past medical history of Cough, Shortness of breath, Sputum production, and Wheezing.    She has no past medical history of Anemia, ASTHMA, Diabetes, or Painful breathing.  She  has no past surgical history on file.    Family History   Problem Relation Age of Onset    Non-contributory Mother     Non-contributory Father     Heart Disease Other     Diabetes Other     Breast Cancer Neg Hx     Colorectal Cancer Neg Hx     Ovarian Cancer Neg Hx     Tubal Cancer Neg Hx     Peritoneal Cancer Neg Hx        Social History     Socioeconomic History    Marital status: Single     Spouse name: Not on file    Number of children: Not on file    Years of education: Not on file    Highest education level: Not on file   Occupational History    Not on file   Tobacco Use    Smoking status: Never     Passive exposure: Never    Smokeless tobacco: Never   Vaping Use    Vaping Use: Never used   Substance and Sexual Activity    Alcohol use: Yes     Comment: sometimes    Drug use: No    Sexual activity: Yes     Partners: Male     Birth control/protection: Condom   Other Topics Concern    Not on file   Social History Narrative    Not on file     Social Determinants of Health     Financial Resource Strain: Not on file   Food Insecurity: Not on file   Transportation Needs: Not on file   Physical Activity: Not on file   Stress: Not on file   Social Connections: Not on file   Intimate Partner Violence: Not on file   Housing Stability: Not on file       Patient Active Problem List    Diagnosis Date Noted    Mild intermittent asthma without complication 03/16/2023    Pneumonia of both lower lobes 11/10/2022    Chronic cough 11/10/2022    Childhood asthma without complication 11/10/2022    Obesity (BMI 30-39.9) 11/10/2022         Current Outpatient Medications   Medication Sig Dispense Refill    albuterol 108 (90 Base) MCG/ACT Aero Soln inhalation aerosol Inhale 2 Puffs every four hours as needed for Shortness of Breath. 1  "Each 11    fluticasone furoate (ARNUITY ELLIPTA) 200 MCG/ACT AEROSOL POWDER, BREATH ACTIVATED inhaler Inhale 1 Puff every day. Rinse mouth after each use. 30 Each 3    benzonatate (TESSALON) 100 MG Cap Take 1 Capsule by mouth 3 times a day as needed for Cough. 60 Capsule 0     No current facility-administered medications for this visit.     Allergies   Allergen Reactions    Peanut-Derived Anaphylaxis     Review of Systems   Constitutional: Negative for fever, chills and malaise/fatigue.   HENT: Negative for congestion.    Eyes: Negative for pain.    Respiratory: Negative for cough and shortness of breath.  Cardiovascular: Negative for leg swelling.   Gastrointestinal: Negative for nausea, vomiting, abdominal pain and diarrhea.   Genitourinary: Negative for dysuria and hematuria.   Skin: Negative for rash.   Neurological: Negative for dizziness, focal weakness and headaches.   Endo/Heme/Allergies: Does not bleed easily.   Psychiatric/Behavioral: Negative for depression.  The patient is not nervous/anxious.      Objective:     /80 (BP Location: Left arm, Patient Position: Sitting, BP Cuff Size: Adult)   Pulse 84   Temp 36.5 °C (97.7 °F) (Temporal)   Ht 1.727 m (5' 8\")   Wt 100 kg (221 lb)   LMP 11/22/2023   SpO2 97%   BMI 33.60 kg/m²   Body mass index is 33.6 kg/m².  Wt Readings from Last 4 Encounters:   12/05/23 100 kg (221 lb)   08/06/23 90.7 kg (200 lb)   08/01/23 90.7 kg (200 lb)   06/15/23 95.7 kg (211 lb)       Physical Exam:  Constitutional: Well-developed and well-nourished. Not diaphoretic. No distress.   Skin: Skin is warm and dry. No rash noted.  Head: Atraumatic without lesions.  Eyes: Conjunctivae and extraocular motions are normal. Pupils are equal, round, and reactive to light. No scleral icterus.   Ears:  External ears unremarkable. Tympanic membranes clear and intact.  Nose: Nares patent. Septum midline. Turbinates without erythema nor edema. No discharge.   Mouth/Throat:   Neck: Supple, " trachea midline. Normal range of motion. No thyromegaly present. No lymphadenopathy--cervical or supraclavicular.  Cardiovascular: Regular rate and rhythm, S1 and S2 without murmur, rubs, or gallops.  Lungs: Normal inspiratory effort, CTA bilaterally, no wheezes/rhonchi/rales  Abdomen: Soft, non tender, and without distention. Active bowel sounds in all four quadrants. No rebound, guarding, masses or HSM.  Extremities: No cyanosis, clubbing, erythema, nor edema. Distal pulses intact and symmetric.   Musculoskeletal: All major joints AROM full in all directions without pain.  Neurological: Alert and oriented x 3. DTRs 2+/3 and symmetric. No cranial nerve deficit. 5/5 myotomes. Sensation intact.   Psychiatric:  Behavior, mood, and affect are appropriate.      Assessment and Plan:     1. Wellness examination  PMH/PSH/FH/Social history reviewed. Medication reconciled. Vaccinations discussed. Previous records and labs reviewed. Discussed age appropriate anticipatory guidance.  Will screen for anemia, thyroid disorder, metabolic disorder, cardiovascular disease, diabetes, and vitamin deficiency. Will order labs.    - HIV AG/AB COMBO ASSAY SCREENING; Future  - HEP C VIRUS ANTIBODY; Future  - CBC WITH DIFFERENTIAL; Future  - Comp Metabolic Panel; Future  - Lipid Profile; Future  - TSH WITH REFLEX TO FT4; Future  - VITAMIN D,25 HYDROXY (DEFICIENCY); Future  - HEMOGLOBIN A1C; Future    2. Obesity (BMI 30-39.9)  Discussed lifestyle and dietary changes such as low-carb diet, high in vegetables and fresh fruits.  Encouraged to increase water intake.  Regular physical exercise at least 30 minutes/day 5 days a week. Weight reduction if applicable (aim for 5% to 10% body weight increments). Patient is at an increased risk for serious conditions such as heart disease, type 2 diabetes, [place], certain types of cancers, gallbladder disease, and circulation problems. Will order labs to rule out.     - CBC WITH DIFFERENTIAL; Future  - Comp  Metabolic Panel; Future  - Lipid Profile; Future  - TSH WITH REFLEX TO FT4; Future  - VITAMIN D,25 HYDROXY (DEFICIENCY); Future  - HEMOGLOBIN A1C; Future    3. Mild intermittent asthma without complication  4. Cough due to bronchospasm  Chronic and stable condition.  Patient without recurrent symptoms.  Requesting refill on arnuity and albuterol inhaler for as needed use only.  Discussed side effects such as increased heart rate.  Encourage nonpharmacological interventions such as avoiding allergens.    F/u with pulmonary as scheduled.   Discussed signs and symptoms of respiratory distress and need to present to ED.    - albuterol 108 (90 Base) MCG/ACT Aero Soln inhalation aerosol; Inhale 2 Puffs every four hours as needed for Shortness of Breath.  Dispense: 1 Each; Refill: 11  - fluticasone furoate (ARNUITY ELLIPTA) 200 MCG/ACT AEROSOL POWDER, BREATH ACTIVATED inhaler; Inhale 1 Puff every day. Rinse mouth after each use.  Dispense: 30 Each; Refill: 3  - benzonatate (TESSALON) 100 MG Cap; Take 1 Capsule by mouth 3 times a day as needed for Cough.  Dispense: 60 Capsule; Refill: 0    5. Encounter to obtain excuse from work  Work note provided to patient.    6. Need for vaccination    - INFLUENZA VACCINE QUAD INJ (PF)    7. Need for hepatitis C screening test    - HEP C VIRUS ANTIBODY; Future    8. Screening for HIV (human immunodeficiency virus)    - HIV AG/AB COMBO ASSAY SCREENING; Future      HCM:  completed   Labs per orders  Immunizations per orders  Patient counseled about skin care, diet, supplements, prenatal vitamins, safe sex and exercise.    -Smoking cessation discussed if smoking and encouraged to come to office if quit and tempted or restarts smoking if pertinent to this patient. We discourage use of electronic smoking devises.   -Discussed healthy drinking habits if over 7 for females, 14 for males per week or more than 4 in one day.  -Discussed healthy eating habits, exercise, being physically active,  healthy bmi below 25  -patient to provide ages of family members when they were diagnosed with cancer , especially breast and ovarian, pancreatic cancers.  -Reviewed pap history in female patients, if they have a gynecologist they are encouraged to follow up with that doctor for annual pelvic and breast exams. If they prefer to see me for women's health, I will perform pap smear with hpv dna testing, pelvic, and breast exam, these and male genital exams are always done in the presence of a medical assistant and with verbal permission from the patient.   -Colonoscopy history reviewed with those over 44yo or those with early family h/o colon cancer. If patient is due we provide them with various colon cancer screen modalities and relevant information to help the patient decide which is best for them.   -Mammograms recommended yearly for women over 39yo. Risks and benefits are reviewed and discussed with the patient and mammogram script provided.   -PSA discussed with males with family history of prostate cancer or those concerned. The decision to order this test is made with the patient.   -If patient prescribed medicines then told to review package insert for any warnings, side effects, contra-indications and medication vs medication reactions.   -STD testing added to lab work due to patients age per guideline recommendations  -Patients screened for anxiety and depression. If positive screening patients are offered behavioral health services, medications, and tools to improve mood.   There are no diagnoses linked to this encounter.  -to improve bone health take calcium and vitamin D, perform weight-bearing exercise, in addition we can discuss additional medications if needed including bisphosphonates, parathyroid hormone, and raloxifene.  Esophageal irritation can occur with bisphosphonate therapy this can be reduced by not laying down for 30 min after taking and taking with a full glass of water  -if wearing nail  polish on toes or hands asked to rto if there are any dark brown or black areas under the nails  If lab tests ordered, then patient instructed to go to lab/location/plan  If imaging tests ordered, then patient instructed to go to radiology/location/plan  If medicines ordered, then patient instructed to go to pharmacy/location/plan  Health maintenance I reviewed both men and women's health maintenance  Leading causes of death are motor vehicle accidents, cardiovascular disease, malignant tumors, and HIV.   Breast and ovarian cancer mutation screening was suggested if there was an increased risk for the patient based on [place] scoring.   -A general visit to see the eye doctor every one to two years was thought appropriate. Dentist ever 6-12 months.  -Immunization suggestions: Tetanus shot every 10 years, Influenza immunization pneumococcal- anyone with chronic illnesses    Follow-up: Return if symptoms worsen or fail to improve.    Thank you, Carrol RAINEY  [facility]

## 2023-12-15 ENCOUNTER — APPOINTMENT (OUTPATIENT)
Dept: SLEEP MEDICINE | Facility: MEDICAL CENTER | Age: 28
End: 2023-12-15
Attending: NURSE PRACTITIONER
Payer: COMMERCIAL

## 2024-02-26 ENCOUNTER — HOSPITAL ENCOUNTER (OUTPATIENT)
Facility: MEDICAL CENTER | Age: 29
End: 2024-02-26
Payer: COMMERCIAL

## 2024-02-26 ENCOUNTER — OFFICE VISIT (OUTPATIENT)
Dept: MEDICAL GROUP | Facility: IMAGING CENTER | Age: 29
End: 2024-02-26
Payer: COMMERCIAL

## 2024-02-26 VITALS
BODY MASS INDEX: 33.8 KG/M2 | SYSTOLIC BLOOD PRESSURE: 116 MMHG | RESPIRATION RATE: 14 BRPM | HEART RATE: 94 BPM | WEIGHT: 223 LBS | OXYGEN SATURATION: 96 % | DIASTOLIC BLOOD PRESSURE: 70 MMHG | TEMPERATURE: 97.4 F | HEIGHT: 68 IN

## 2024-02-26 DIAGNOSIS — Z11.3 SCREENING EXAMINATION FOR SEXUALLY TRANSMITTED DISEASE: ICD-10-CM

## 2024-02-26 DIAGNOSIS — Z32.01 PREGNANCY TEST POSITIVE: ICD-10-CM

## 2024-02-26 DIAGNOSIS — N92.6 MISSED MENSES: ICD-10-CM

## 2024-02-26 DIAGNOSIS — Z00.00 WELLNESS EXAMINATION: ICD-10-CM

## 2024-02-26 LAB
POCT INT CON NEG: NEGATIVE
POCT INT CON POS: POSITIVE
POCT URINE PREGNANCY TEST: POSITIVE

## 2024-02-26 PROCEDURE — 3074F SYST BP LT 130 MM HG: CPT

## 2024-02-26 PROCEDURE — 99213 OFFICE O/P EST LOW 20 MIN: CPT

## 2024-02-26 PROCEDURE — 3078F DIAST BP <80 MM HG: CPT

## 2024-02-26 PROCEDURE — 81025 URINE PREGNANCY TEST: CPT

## 2024-02-26 PROCEDURE — 87591 N.GONORRHOEAE DNA AMP PROB: CPT

## 2024-02-26 PROCEDURE — 87491 CHLMYD TRACH DNA AMP PROBE: CPT

## 2024-02-26 ASSESSMENT — PATIENT HEALTH QUESTIONNAIRE - PHQ9: CLINICAL INTERPRETATION OF PHQ2 SCORE: 0

## 2024-02-26 NOTE — LETTER
[facility]  [address] [place] [address]     February 26, 2024    Patient: Ruel Guajardo   YOB: 1995   Date of Visit: 2/26/2024       To Whom It May Concern:    Ruel Guajardo was seen and treated in our department on 2/26/2024.     Sincerely,     Sade Winter, Med Ass't

## 2024-02-26 NOTE — LETTER
[employer]  CANDE PoolP.R.N.  [address] [place] [address]  Fax: [phone]   Authorization for Release/Disclosure of   Protected Health Information   Name: VEENA GUAJARDO : 1995 SSN: [id number]   Address: [address] [place] [address]   [place] [address] Phone:    [phone] (home)    I authorize the entity listed below to release/disclose the PHI below to:   [employer]/Carrol Malcolm A.P.R.N. and JUDAH Pool.P.R.N.   Provider or Entity Name:     Address   City, State, Zip   Phone:      Fax:     Reason for request: continuity of care   Information to be released:    [  ] LAST COLONOSCOPY,  including any PATH REPORT and follow-up  [  ] LAST FIT/COLOGUARD RESULT [  ] LAST DEXA  [  ] LAST MAMMOGRAM  [  ] LAST PAP  [  ] LAST LABS [  ] RETINA EXAM REPORT  [  ] IMMUNIZATION RECORDS  [  ] Release all info      [  ] Check here and initial the line next to each item to release ALL health information INCLUDING  _____ Care and treatment for drug and / or alcohol abuse  _____ HIV testing, infection status, or AIDS  _____ Genetic Testing    DATES OF SERVICE OR TIME PERIOD TO BE DISCLOSED: _____________  I understand and acknowledge that:  * This Authorization may be revoked at any time by you in writing, except if your health information has already been used or disclosed.  * Your health information that will be used or disclosed as a result of you signing this authorization could be re-disclosed by the recipient. If this occurs, your re-disclosed health information may no longer be protected by State or Federal laws.  * You may refuse to sign this Authorization. Your refusal will not affect your ability to obtain treatment.  * This Authorization becomes effective upon signing and will  on (date) __________.      If no date is indicated, this Authorization will  one (1) year from the signature date.    Name: Veena Guajardo  Signature: Date:   2024     PLEASE  FAX REQUESTED RECORDS BACK TO: [phone]

## 2024-02-26 NOTE — PROGRESS NOTES
Subjective:     CC:   Chief Complaint   Patient presents with    Follow-Up     For confirming pregnancy and requesting for lab    Other     Referral to OB-Gyn        HPI:   Ruel presents today to discuss:    Missed menses  Pregnancy test positive  Patient reports last menstrual cycle was on 1/12. She took 2 at home pregnancy tests with positive results. She is having symptoms of morning sickness and breast tenderness.   This will be her first pregnancy which is unexpected.   She is requesting labs.       Past Medical History:   Diagnosis Date    Cough     Shortness of breath     Sputum production     Wheezing      Family History   Problem Relation Age of Onset    Non-contributory Mother     Non-contributory Father     Heart Disease Other     Diabetes Other     Breast Cancer Neg Hx     Colorectal Cancer Neg Hx     Ovarian Cancer Neg Hx     Tubal Cancer Neg Hx     Peritoneal Cancer Neg Hx      History reviewed. No pertinent surgical history.  Social History     Tobacco Use    Smoking status: Never     Passive exposure: Never    Smokeless tobacco: Never   Vaping Use    Vaping Use: Never used   Substance Use Topics    Alcohol use: Not Currently     Comment: sometimes    Drug use: No     Social History     Social History Narrative    Not on file     Current Outpatient Medications Ordered in Epic   Medication Sig Dispense Refill    albuterol 108 (90 Base) MCG/ACT Aero Soln inhalation aerosol Inhale 2 Puffs every four hours as needed for Shortness of Breath. 1 Each 11    fluticasone furoate (ARNUITY ELLIPTA) 200 MCG/ACT AEROSOL POWDER, BREATH ACTIVATED inhaler Inhale 1 Puff every day. Rinse mouth after each use. 30 Each 3    benzonatate (TESSALON) 100 MG Cap Take 1 Capsule by mouth 3 times a day as needed for Cough. 60 Capsule 0     No current Epic-ordered facility-administered medications on file.     Peanut-derived  ROS: see hpi  Gen: no fevers/chills  Pulm: no sob, no cough  CV: no chest pain, no palpitations, no  "edema  GI: no nausea/vomiting, no diarrhea  Skin: no rash    Objective:   Exam:  /70 (BP Location: Left arm, Patient Position: Sitting, BP Cuff Size: Adult)   Pulse 94   Temp 36.3 °C (97.4 °F) (Temporal)   Resp 14   Ht 1.727 m (5' 8\")   Wt 101 kg (223 lb)   LMP 01/11/2024   SpO2 96%   BMI 33.91 kg/m²    Body mass index is 33.91 kg/m².    Gen: Alert and oriented, No apparent distress.  HEENT: Head atraumatic, normocephalic. Pupils equal and round.  Neck: Neck is supple without lymphadenopathy.   Lungs: Normal effort, CTA bilaterally, no wheezes, rhonchi, or rales  CV: Regular rate and rhythm. No murmurs, rubs, or gallops.  ABD: +BS. Non-tender, non-distended. No rebound, rigidity, or guarding.  Ext: No clubbing, cyanosis, edema.    Assessment & Plan:     28 y.o. female with the following -     1. Missed menses  New condition. Hcg urine positive in office. Will order labs and refer to ob/gyn     - POCT Pregnancy  - HCG QUANTITATIVE; Future  - Referral to OB/Gyn    2. Pregnancy test positive  Positive urine hcg in office.     - POCT Pregnancy  - HCG QUANTITATIVE; Future    3. Screening examination for sexually transmitted disease  Recommend STI testing, pt agreeable to this.     - HIV AG/AB Combo Assay Screening; Future  - T.Pallidum AB MARCELLUS (Screening); Future  - Trichomonas Vaginalis by TMA  - Hepatitis C Virus Antibody; Future  - HEP B Surface Antibody; Future  - Hep B Core AB Total; Future  - Hep B Surface Antigen; Future  - Chlamydia/GC, PCR (Genital/Anal swab); Future    4. Wellness examination  PMH/PSH/FH/Social history reviewed. Medication reconciled. Vaccinations discussed. Previous records and labs reviewed. Discussed age appropriate anticipatory guidance.  Will screen for anemia, thyroid disorder, metabolic disorder, cardiovascular disease, diabetes, and vitamin deficiency. Will order labs.    - HCG QUANTITATIVE; Future  - CBC WITH DIFFERENTIAL; Future  - Comp Metabolic Panel; Future  - Lipid " Profile; Future  - TSH WITH REFLEX TO FT4; Future  - VITAMIN D,25 HYDROXY (DEFICIENCY); Future  - HEMOGLOBIN A1C; Future    Medical Decision Making/Course:  In the course of preparing for this visit with review of the pertinent past medical history, recent and past clinic visits, current medications, and performing chart, immunization, medical history and medication reconciliation, and in the further course of obtaining the current history pertinent to the clinic visit today, performing an exam and evaluation, ordering and independently evaluating labs, tests, and/or procedures, prescribing any recommended new medications as noted above, providing any pertinent counseling and education and recommending further coordination of care. This was discussed with patient in a shared-decision making conversation, and they understand and agreed with plan of care.     Return if symptoms worsen or fail to improve.    JOAQUIN Pool.   [facility]    Please note that this dictation was created using voice recognition software. I have made every reasonable attempt to correct obvious errors, but I expect that there are errors of grammar and possibly content that I did not discover before finalizing the note.

## 2024-02-26 NOTE — LETTER
[facility] [place]  [facility]  [address] [address]     February 26, 2024    Patient: Ruel Guajardo   YOB: 1995   Date of Visit: 2/26/2024       To Whom It May Concern:    Ruel Guajardo was seen and treated in our department on 2/26/2024. Please excuse Ruel from work today.     Sincerely,     ISIAH Pool   Electronically signed 2/26/2024

## 2024-02-27 LAB
C TRACH DNA GENITAL QL NAA+PROBE: NEGATIVE
N GONORRHOEA DNA GENITAL QL NAA+PROBE: NEGATIVE
SPECIMEN SOURCE: NORMAL

## 2024-03-20 ENCOUNTER — TELEMEDICINE (OUTPATIENT)
Dept: MEDICAL GROUP | Facility: IMAGING CENTER | Age: 29
End: 2024-03-20
Payer: COMMERCIAL

## 2024-03-20 VITALS — TEMPERATURE: 97.4 F | WEIGHT: 225 LBS | BODY MASS INDEX: 34.1 KG/M2 | HEIGHT: 68 IN

## 2024-03-20 DIAGNOSIS — O26.899 SHORTNESS OF BREATH DUE TO PREGNANCY: ICD-10-CM

## 2024-03-20 DIAGNOSIS — J45.909 MATERNAL ASTHMA COMPLICATING PREGNANCY: ICD-10-CM

## 2024-03-20 DIAGNOSIS — O99.519 MATERNAL ASTHMA COMPLICATING PREGNANCY: ICD-10-CM

## 2024-03-20 DIAGNOSIS — R06.02 SHORTNESS OF BREATH DUE TO PREGNANCY: ICD-10-CM

## 2024-03-20 DIAGNOSIS — Z02.89 ENCOUNTER FOR COMPLETION OF FORM WITH PATIENT: ICD-10-CM

## 2024-03-20 PROCEDURE — 99213 OFFICE O/P EST LOW 20 MIN: CPT | Mod: 95

## 2024-03-20 NOTE — PROGRESS NOTES
Virtual Visit: Established Patient   This visit was conducted via Zoom using secure and encrypted videoconferencing technology.   The patient was in their home in the [facility].    The patient's identity was confirmed and verbal consent was obtained for this virtual visit.    Subjective:   CC:   Chief Complaint   Patient presents with    Asthma     Pt needs medical restriction letter for work  because she is 9 weeks pregnant and her asthma   Pt report shortness of breath while working , headaches, fatigue       Ruel Guajardo is a 28 y.o. female presenting for evaluation and management of:    Maternal asthma complicating pregnancy  Shortness of breath due to pregnancy  Patient admits 9 weeks gestational pregnancy. She is scheduled to see ob/gyn 4/10.  She admits having asthma exacerbation daily since pregnancy that is provoked by job duties. Her symptoms include SOB with wheezy and dry cough. She is using albuterol inhaler PRN which is helpful.   She works 2 jobs ([employer]) that require lifting boxes, pushing carts, prolonged walking, and bending which provokes her asthma symptoms.   She is requesting letter for job duty restriction during pregnancy.  She denies difficulty breathing, persistent pain or pressure in the chest, confusion, inability to wake or stay awake, bluish lips or face, persistent tachycardia (fast heart rate), prolonged dizziness, or fainting.    ROS: per hpi  Gen: no fevers/chills  Pulm: yes sob and cough  CV: no chest pain, no palpitations, no edema  GI: no nausea/vomiting, no diarrhea  Skin: no rash      Current medicines (including changes today)  Current Outpatient Medications   Medication Sig Dispense Refill    albuterol 108 (90 Base) MCG/ACT Aero Soln inhalation aerosol Inhale 2 Puffs every four hours as needed for Shortness of Breath. 1 Each 11    fluticasone furoate (ARNUITY ELLIPTA) 200 MCG/ACT AEROSOL POWDER, BREATH ACTIVATED inhaler Inhale 1 Puff every day. Rinse mouth  "after each use. 30 Each 3    benzonatate (TESSALON) 100 MG Cap Take 1 Capsule by mouth 3 times a day as needed for Cough. 60 Capsule 0     No current facility-administered medications for this visit.       Patient Active Problem List    Diagnosis Date Noted    Shortness of breath due to pregnancy 03/20/2024    Mild intermittent asthma without complication 03/16/2023    Pneumonia of both lower lobes 11/10/2022    Chronic cough 11/10/2022    Childhood asthma without complication 11/10/2022    Obesity (BMI 30-39.9) 11/10/2022        Objective:   Temp 36.3 °C (97.4 °F) Comment: 2/26/2024  Ht 1.727 m (5' 8\")   Wt 102 kg (225 lb) Comment: pt report  LMP 01/11/2024   BMI 34.21 kg/m²     Physical Exam:  Constitutional: Alert, no distress, well-groomed.  Skin: No rashes in visible areas.  Eye: Round. Conjunctiva clear, lids normal. No icterus.   ENMT: Lips pink without lesions, good dentition, moist mucous membranes. Phonation normal.  Neck: No masses, no thyromegaly. Moves freely without pain.  Respiratory: Unlabored respiratory effort, no cough or audible wheeze  Psych: Alert and oriented x3, normal affect and mood.     Assessment and Plan:   The following treatment plan was discussed:     1. Maternal asthma complicating pregnancy  2. Shortness of breath due to pregnancy  Chronic condition, partially controlled r/t current job duties. Will provide job restriction letter and accomodation to prevent asthma exacerbations.  Recommend to continue with albuterol inhaler. Recommend to f/u with ob/gyn.  If symptoms continue to worsen, will refer to pulmonary for management.  ED symptoms discussed.    3. Encounter for completion of form with patient  Work restriction letter provided to patient.    I spent a total of 21 minutes with record review, exam, communication with the patient, communication with other providers, and documentation of this encounter.       Follow-up: Return if symptoms worsen or fail to improve.    Thank " you, Carrol RAINEY  [facility]

## 2024-03-20 NOTE — LETTER
[facility]  [place] [facility]  [address] [place] [address]     March 20, 2024    Patient: Ruel Guajardo   YOB: 1995   Date of Visit: 3/20/2024       To Whom It May Concern:    Ruel Guajardo was seen and treated in our department on 3/20/2024. Due to patient's pregnancy, some of her job duties are causing asthma exacerbation. It is advisable that patient have work restriction during pregnancy to prevent complications and bodily harm. Patient should avoid heavy lifting or pushing more than 10 lbs, prolonged walking, and excessive bending.   If you have any questions, please contact [phone]    Sincerely,   ISIAH Pool   Electronically signed 3/20/2024 at 9:00 am

## 2024-04-10 ENCOUNTER — HOSPITAL ENCOUNTER (OUTPATIENT)
Facility: MEDICAL CENTER | Age: 29
End: 2024-04-10
Attending: OBSTETRICS & GYNECOLOGY
Payer: COMMERCIAL

## 2024-04-10 PROCEDURE — 87491 CHLMYD TRACH DNA AMP PROBE: CPT

## 2024-04-10 PROCEDURE — 87591 N.GONORRHOEAE DNA AMP PROB: CPT

## 2024-04-10 PROCEDURE — 88175 CYTOPATH C/V AUTO FLUID REDO: CPT

## 2024-04-15 LAB
C TRACH RRNA CVX QL NAA+PROBE: NEGATIVE
COMMENT NL11729A: NORMAL
CYTOLOGIST CVX/VAG CYTO: NORMAL
CYTOLOGY CVX/VAG DOC CYTO: NORMAL
CYTOLOGY CVX/VAG DOC THIN PREP: NORMAL
N GONORRHOEA RRNA CVX QL NAA+PROBE: NEGATIVE
NOTE NL11727A: NORMAL
OTHER STN SPEC: NORMAL
STAT OF ADQ CVX/VAG CYTO-IMP: NORMAL

## 2024-04-19 ENCOUNTER — HOSPITAL ENCOUNTER (OUTPATIENT)
Facility: MEDICAL CENTER | Age: 29
End: 2024-04-19
Attending: OBSTETRICS & GYNECOLOGY
Payer: COMMERCIAL

## 2024-04-19 LAB
ABO GROUP BLD: NORMAL
BASOPHILS # BLD AUTO: 0.2 % (ref 0–1.8)
BASOPHILS # BLD: 0.02 K/UL (ref 0–0.12)
BLD GP AB SCN SERPL QL: NORMAL
EOSINOPHIL # BLD AUTO: 0.29 K/UL (ref 0–0.51)
EOSINOPHIL NFR BLD: 2.7 % (ref 0–6.9)
ERYTHROCYTE [DISTWIDTH] IN BLOOD BY AUTOMATED COUNT: 38.9 FL (ref 35.9–50)
HBV SURFACE AG SER QL: ABNORMAL
HCT VFR BLD AUTO: 37.4 % (ref 37–47)
HCV AB SER QL: ABNORMAL
HGB BLD-MCNC: 13.1 G/DL (ref 12–16)
HIV 1+2 AB+HIV1 P24 AG SERPL QL IA: NORMAL
IMM GRANULOCYTES # BLD AUTO: 0.05 K/UL (ref 0–0.11)
IMM GRANULOCYTES NFR BLD AUTO: 0.5 % (ref 0–0.9)
LYMPHOCYTES # BLD AUTO: 1.81 K/UL (ref 1–4.8)
LYMPHOCYTES NFR BLD: 16.7 % (ref 22–41)
MCH RBC QN AUTO: 29.7 PG (ref 27–33)
MCHC RBC AUTO-ENTMCNC: 35 G/DL (ref 32.2–35.5)
MCV RBC AUTO: 84.8 FL (ref 81.4–97.8)
MONOCYTES # BLD AUTO: 0.37 K/UL (ref 0–0.85)
MONOCYTES NFR BLD AUTO: 3.4 % (ref 0–13.4)
NEUTROPHILS # BLD AUTO: 8.31 K/UL (ref 1.82–7.42)
NEUTROPHILS NFR BLD: 76.5 % (ref 44–72)
NRBC # BLD AUTO: 0 K/UL
NRBC BLD-RTO: 0 /100 WBC (ref 0–0.2)
PLATELET # BLD AUTO: 372 K/UL (ref 164–446)
PMV BLD AUTO: 9.6 FL (ref 9–12.9)
RBC # BLD AUTO: 4.41 M/UL (ref 4.2–5.4)
RH BLD: NORMAL
RUBV AB SER QL: 71.7 IU/ML
T PALLIDUM AB SER QL IA: ABNORMAL
WBC # BLD AUTO: 10.9 K/UL (ref 4.8–10.8)

## 2024-04-19 PROCEDURE — 87086 URINE CULTURE/COLONY COUNT: CPT

## 2024-04-19 PROCEDURE — 87389 HIV-1 AG W/HIV-1&-2 AB AG IA: CPT

## 2024-04-19 PROCEDURE — 86780 TREPONEMA PALLIDUM: CPT

## 2024-04-19 PROCEDURE — 36415 COLL VENOUS BLD VENIPUNCTURE: CPT

## 2024-04-19 PROCEDURE — 86762 RUBELLA ANTIBODY: CPT

## 2024-04-19 PROCEDURE — 87340 HEPATITIS B SURFACE AG IA: CPT

## 2024-04-19 PROCEDURE — 86900 BLOOD TYPING SEROLOGIC ABO: CPT

## 2024-04-19 PROCEDURE — 86901 BLOOD TYPING SEROLOGIC RH(D): CPT

## 2024-04-19 PROCEDURE — 86592 SYPHILIS TEST NON-TREP QUAL: CPT

## 2024-04-19 PROCEDURE — 86803 HEPATITIS C AB TEST: CPT

## 2024-04-19 PROCEDURE — 86850 RBC ANTIBODY SCREEN: CPT

## 2024-04-19 PROCEDURE — 85025 COMPLETE CBC W/AUTO DIFF WBC: CPT

## 2024-04-21 LAB
BACTERIA UR CULT: NORMAL
SIGNIFICANT IND 70042: NORMAL
SITE SITE: NORMAL
SOURCE SOURCE: NORMAL

## 2024-05-06 ENCOUNTER — HOSPITAL ENCOUNTER (EMERGENCY)
Facility: MEDICAL CENTER | Age: 29
End: 2024-05-06
Attending: EMERGENCY MEDICINE
Payer: COMMERCIAL

## 2024-05-06 VITALS
WEIGHT: 223.77 LBS | BODY MASS INDEX: 35.12 KG/M2 | DIASTOLIC BLOOD PRESSURE: 60 MMHG | HEART RATE: 88 BPM | TEMPERATURE: 97.3 F | HEIGHT: 67 IN | OXYGEN SATURATION: 100 % | RESPIRATION RATE: 20 BRPM | SYSTOLIC BLOOD PRESSURE: 122 MMHG

## 2024-05-06 DIAGNOSIS — Z87.09 HISTORY OF ASTHMA: ICD-10-CM

## 2024-05-06 DIAGNOSIS — R42 DIZZINESS: ICD-10-CM

## 2024-05-06 DIAGNOSIS — T78.40XA ALLERGIC REACTION, INITIAL ENCOUNTER: ICD-10-CM

## 2024-05-06 DIAGNOSIS — L50.9 URTICARIA: ICD-10-CM

## 2024-05-06 LAB — EKG IMPRESSION: NORMAL

## 2024-05-06 RX ORDER — METHYLPREDNISOLONE 4 MG/1
TABLET ORAL
Qty: 1 EACH | Refills: 0 | Status: SHIPPED | OUTPATIENT
Start: 2024-05-06 | End: 2024-05-08

## 2024-05-06 RX ORDER — METHYLPREDNISOLONE SODIUM SUCCINATE 125 MG/2ML
125 INJECTION, POWDER, LYOPHILIZED, FOR SOLUTION INTRAMUSCULAR; INTRAVENOUS ONCE
Status: COMPLETED | OUTPATIENT
Start: 2024-05-06 | End: 2024-05-06

## 2024-05-06 RX ORDER — DIPHENHYDRAMINE HYDROCHLORIDE 50 MG/ML
50 INJECTION INTRAMUSCULAR; INTRAVENOUS ONCE
Status: COMPLETED | OUTPATIENT
Start: 2024-05-06 | End: 2024-05-06

## 2024-05-06 RX ORDER — FAMOTIDINE 40 MG/1
40 TABLET, FILM COATED ORAL DAILY
Qty: 7 TABLET | Refills: 0 | Status: SHIPPED | OUTPATIENT
Start: 2024-05-06

## 2024-05-06 RX ORDER — SODIUM CHLORIDE 9 MG/ML
1000 INJECTION, SOLUTION INTRAVENOUS ONCE
Status: COMPLETED | OUTPATIENT
Start: 2024-05-06 | End: 2024-05-06

## 2024-05-06 RX ADMIN — SODIUM CHLORIDE 1000 ML: 9 INJECTION, SOLUTION INTRAVENOUS at 17:39

## 2024-05-06 RX ADMIN — METHYLPREDNISOLONE SODIUM SUCCINATE 125 MG: 125 INJECTION, POWDER, FOR SOLUTION INTRAMUSCULAR; INTRAVENOUS at 17:42

## 2024-05-06 RX ADMIN — DIPHENHYDRAMINE HYDROCHLORIDE 50 MG: 50 INJECTION, SOLUTION INTRAMUSCULAR; INTRAVENOUS at 17:39

## 2024-05-06 RX ADMIN — FAMOTIDINE 20 MG: 10 INJECTION, SOLUTION INTRAVENOUS at 17:41

## 2024-05-06 ASSESSMENT — PAIN DESCRIPTION - PAIN TYPE: TYPE: ACUTE PAIN

## 2024-05-06 NOTE — ED TRIAGE NOTES
Chief Complaint   Patient presents with    Allergic Reaction     Pt endorses hives and itchiness all over her body after being exposed to a cat yesterday     Dizziness     Ambulatory to triage for above complaints, A+O x 4, GCS 15, NAD, answering all questions appropriately    Denies difficulty swallowing, no throat swelling     Pt is 16 weeks pregnant

## 2024-05-07 NOTE — ED NOTES
DC home with written and verbal education on Allergic reaction. She has been provided 2 prescribed medications and instructed to obtained them from her [facility]. She verbalized understanding of how to take those medications and to follow up with primary care.

## 2024-05-07 NOTE — ED PROVIDER NOTES
ER Provider Note    Scribed for Dr. Anita Ash D.O. by Az Hernandez. 5/6/2024  5:31 PM    Primary Care Provider: ISIAH Pool    CHIEF COMPLAINT  Chief Complaint   Patient presents with    Allergic Reaction     Pt endorses hives and itchiness all over her body after being exposed to a cat yesterday     Dizziness       EXTERNAL RECORDS REVIEWED  Patient was seen outpatient on 3/20/2024 for maternal asthma.    HPI/ROS  LIMITATION TO HISTORY   Select: : None    OUTSIDE HISTORIAN(S):  None    Ruel Guajardo is a 28 y.o. female with a history of Asthma who presents to the ED for allergic reaction onset yesterday. The patient has a known allergy to cats and touched her significant other's cat yesterday. She reports experiencing a generalized rash that is very itchy with hives. She states she took 4 doses of 50 mg Benadryl to no alleviation. Patient reports also experiencing some chest tightness upon arrival to the ED. She denies any difficulty breathing or swallowing.     PAST MEDICAL HISTORY  Past Medical History:   Diagnosis Date    Cough     Shortness of breath     Sputum production     Wheezing        SURGICAL HISTORY  History reviewed. No pertinent surgical history.    FAMILY HISTORY  Family History   Problem Relation Age of Onset    Non-contributory Mother     Non-contributory Father     Heart Disease Other     Diabetes Other     Breast Cancer Neg Hx     Colorectal Cancer Neg Hx     Ovarian Cancer Neg Hx     Tubal Cancer Neg Hx     Peritoneal Cancer Neg Hx        SOCIAL HISTORY   reports that she has never smoked. She has never been exposed to tobacco smoke. She has never used smokeless tobacco. She reports that she does not currently use alcohol. She reports that she does not use drugs.    CURRENT MEDICATIONS  Previous Medications    ALBUTEROL 108 (90 BASE) MCG/ACT AERO SOLN INHALATION AEROSOL    Inhale 2 Puffs every four hours as needed for Shortness of Breath.    BENZONATATE  "(TESSALON) 100 MG CAP    Take 1 Capsule by mouth 3 times a day as needed for Cough.    FLUTICASONE FUROATE (ARNUITY ELLIPTA) 200 MCG/ACT AEROSOL POWDER, BREATH ACTIVATED INHALER    Inhale 1 Puff every day. Rinse mouth after each use.       ALLERGIES  Other misc and Peanut-derived    PHYSICAL EXAM  /88   Pulse (!) 111   Temp 36.3 °C (97.3 °F) (Temporal)   Resp 18   Ht 1.702 m (5' 7\")   Wt 101 kg (223 lb 12.3 oz)   SpO2 97%   BMI 35.05 kg/m²   Constitutional: Patient is well developed, well nourished. Non-toxic appearing. mild distress.   HENT: Normocephalic,  Able to speak full sentences without difficulty. No tongue or uvular edema  Neck: Supple with no anterior/posterior cervical adenopathy.   Cardiovascular: tachycardic, Regular rhythm. No murmur,  Thorax & Lungs: Clear and equal breath sounds with good excursion. No respiratory distress, no rhonchi, wheezing or rales.   Abdomen: Bowel sounds normal in all four quadrants. Soft,nontender, no rebound , guarding, palpable masses.   Skin: Warm, Dry, erythematous rash with generalized hives.  Extremities: Peripheral pulses 4/4 No edema, No tenderness,    Neurologic: Alert & oriented x 3, Normal motor function, Normal sensory function,  Psychiatric: Affect normal, Judgment normal, Mood normal.     DIAGNOSTIC STUDIES & PROCEDURES    Labs:   Results for orders placed or performed during the hospital encounter of 24   EKG   Result Value Ref Range    Report       [facility] Emergency [facility]    Test Date:  2024  Pt Name:    VEENA DIAS           Department: ER  MRN:        [id number]                      Room:  Gender:     Female                       Technician: [id number]  :        1995                   Requested By:ER TRIAGE PROTOCOL  Order #:    [id number]                    Reading MD:    Measurements  Intervals                                Axis  Rate:       112                          P:          49  CO:         " 110                          QRS:        105  QRSD:       88                           T:          11  QT:         321  QTc:        438    Interpretive Statements  Sinus tachycardia  Borderline right axis deviation  No previous ECG available for comparison     All labs reviewed by me.    EKG:   I have independently interpreted this EKG as seen above.     COURSE & MEDICAL DECISION MAKING    INITIAL ASSESSMENT AND PLAN  Care Narrative:       5:31 PM - Patient seen and evaluated at bedside. Discussed plan of care, including treatment for her symptoms. Patient agrees to plan of care. Patient will be treated with IV Fluids, Solu-medrol 125 mg, Benadryl 50 mg, and Pepcid 20 mg for her symptoms. Ordered EKG to evaluate.  All of her symptoms subsided quickly and she would like to go home to continue medications.    6:43 PM - Patient was reevaluated at bedside. She feels improved at this time and would like to be discharged. Patient had the opportunity to ask any questions. The plan for discharge was discussed with them and they were told to return for any new or worsening symptoms. She was also informed of the plans for follow up. Patient is understanding and agreeable to the plan for discharge.    HYDRATION: Based on the patient's presentation of Tachycardia the patient was given IV fluids. IV Hydration was used because oral hydration was not adequate alone. Upon recheck following hydration, the patient was improved.    ADDITIONAL PROBLEM LIST AND DISPOSITION  Pregnancy               DISPOSITION AND DISCUSSIONS  I have discussed management of the patient with the following physicians and YESSENIA's: None    Discussion of management with other QHP or appropriate source(s): None     Escalation of care considered, and ultimately not performed: diagnostic imaging.    Barriers to care at this time, including but not limited to:  None .     Decision tools and prescription drugs considered including, but not limited to:  Pepcid,  Medrol Dosepak, over-the-counter Benadryl .    The patient will return for new or worsening symptoms and is stable at the time of discharge.    The patient is referred to a primary physician for blood pressure management, diabetic screening, and for all other preventative health concerns.    DISPOSITION:  Patient will be discharged home in stable condition.    FOLLOW UP:  Carrol Malcolm A.P.R.N.  [address] [place] [address] [phone]    Schedule an appointment as soon as possible for a visit in 2 days  If symptoms worsen      OUTPATIENT MEDICATIONS:  New Prescriptions    FAMOTIDINE (PEPCID) 40 MG TAB    Take 1 Tablet by mouth every day.    METHYLPREDNISOLONE (MEDROL DOSEPAK) 4 MG TABLET THERAPY PACK    As directed with food     FINAL IMPRESSION   1. Urticaria    2. Allergic reaction, initial encounter    3. Dizziness    4. History of asthma      Az ZALDIVAR (Bobo), am scribing for, and in the presence of, Anita Ash D.O..    Electronically signed by: Az Hernandez (Bobo), 5/6/2024    Anita ZALDIVAR D.O. personally performed the services described in this documentation, as scribed by Az Hernandez in my presence, and it is both accurate and complete.    The note accurately reflects work and decisions made by me.  Anita Ash D.O.  5/7/2024  1:34 AM

## 2024-05-07 NOTE — DISCHARGE INSTRUCTIONS
Benadryl 50 mg every 6 hrs until rash completely gone.  Take the medrol dose pack with food.  Pepcid daily  Increase fluids, return if difficulty breathing or swallowing.

## 2024-05-08 ENCOUNTER — HOSPITAL ENCOUNTER (EMERGENCY)
Facility: MEDICAL CENTER | Age: 29
End: 2024-05-08
Attending: EMERGENCY MEDICINE
Payer: COMMERCIAL

## 2024-05-08 VITALS
OXYGEN SATURATION: 97 % | HEIGHT: 67 IN | HEART RATE: 89 BPM | WEIGHT: 229.72 LBS | TEMPERATURE: 98 F | DIASTOLIC BLOOD PRESSURE: 67 MMHG | BODY MASS INDEX: 36.06 KG/M2 | RESPIRATION RATE: 15 BRPM | SYSTOLIC BLOOD PRESSURE: 115 MMHG

## 2024-05-08 DIAGNOSIS — R21 RASH: ICD-10-CM

## 2024-05-08 RX ORDER — PREDNISONE 20 MG/1
60 TABLET ORAL DAILY
Qty: 9 TABLET | Refills: 0 | Status: SHIPPED | OUTPATIENT
Start: 2024-05-08 | End: 2024-05-11

## 2024-05-08 NOTE — ED NOTES
Pt ambulated to PUR 81 from lobby with steady gait.  On monitor, call light in reach. Chart up for ERP.

## 2024-05-08 NOTE — ED PROVIDER NOTES
ED Provider Note    CHIEF COMPLAINT  Chief Complaint   Patient presents with    Rash     Pt with rash to entire body and chills, pt states she was seen  5/6 for the same and states rash is getting worse despite medication,        HPI/ROS    Ruel Guajardo is a 28 y.o. female who presents with a pruritic rash.  The patient states she has had this over the last several days.  She states he does have a pleuritic component.  She was evaluated here on 6 May and received IV steroids as well as Benadryl.  She was discharged home on a Medrol Dosepak and initially had some improvement but over last 24 hours the rashes seem to return.  She states she does have some slight shortness of breath as well as slight difficulty with swallowing.  She has not had any associated fevers.  The thought was this was from a cat.  Otherwise she has had no change in cosmetics.  She is 16 weeks pregnant.    PAST MEDICAL HISTORY   has a past medical history of Cough, Shortness of breath, Sputum production, and Wheezing.    SURGICAL HISTORY  patient denies any surgical history    FAMILY HISTORY  Family History   Problem Relation Age of Onset    Non-contributory Mother     Non-contributory Father     Heart Disease Other     Diabetes Other     Breast Cancer Neg Hx     Colorectal Cancer Neg Hx     Ovarian Cancer Neg Hx     Tubal Cancer Neg Hx     Peritoneal Cancer Neg Hx        SOCIAL HISTORY  Social History     Tobacco Use    Smoking status: Never     Passive exposure: Never    Smokeless tobacco: Never   Vaping Use    Vaping Use: Never used   Substance and Sexual Activity    Alcohol use: Not Currently     Comment: sometimes    Drug use: No    Sexual activity: Yes     Partners: Male     Birth control/protection: Condom       CURRENT MEDICATIONS  Home Medications       Reviewed by Ninfa Miller R.N. (Registered Nurse) on 05/08/24 at 1147  Med List Status: Partial     Medication Last Dose Status   albuterol 108 (90 Base) MCG/ACT Aero  "Soln inhalation aerosol  Active   benzonatate (TESSALON) 100 MG Cap  Active   famotidine (PEPCID) 40 MG Tab  Active   fluticasone furoate (ARNUITY ELLIPTA) 200 MCG/ACT AEROSOL POWDER, BREATH ACTIVATED inhaler  Active   methylPREDNISolone (MEDROL DOSEPAK) 4 MG Tablet Therapy Pack  Active                    ALLERGIES  Allergies   Allergen Reactions    Other Misc Hives and Itching     cat    Peanut-Derived Anaphylaxis       PHYSICAL EXAM  VITAL SIGNS: /76   Pulse 99   Temp 36.4 °C (97.6 °F) (Temporal)   Resp 18   Ht 1.702 m (5' 7\")   Wt 104 kg (229 lb 11.5 oz)   LMP 01/11/2024   SpO2 100%   BMI 35.98 kg/m²    In general the patient does not appear toxic    HEENT unremarkable including no uvular edema    Pulmonary the patient's lungs are clear to auscultation bilaterally    Cardiovascular S1-S2 with a regular rate and rhythm    GI abdomen soft    Skin the patient has a very faint macular rash mostly on the extremities with no involvement of the torso    Neurologic examination is grossly intact        COURSE & MEDICAL DECISION MAKING    Is a 28-year-old female who presents to the emergency department with a rash.  She is 16 weeks pregnant but do not see any concerning changes to the rash for a toxic rash during pregnancy.  This could be from a cat allergy or could just be from an atopic eruption as she does have a history of asthma.  Regardless I spoke with the obstetrician and we agreed upon a higher dose of prednisone for the next 3 days.  She will continue her Zyrtec as well as Pepcid.  I would like the patient to recheck in 72 hours if she is not better and sooner if worse.    FINAL DIAGNOSIS  1.  Rash  2.  16-week intrauterine pregnancy    Disposition  The patient will be discharged in stable condition       Electronically signed by: Steffen Carrera M.D., 5/8/2024 12:36 PM      "

## 2024-05-08 NOTE — ED TRIAGE NOTES
".  Chief Complaint   Patient presents with    Rash     Pt with rash to entire body and chills, pt states she was seen  5/6 for the same and states rash is getting worse despite medication,      .BP (!) 127/90   Pulse (!) 114   Temp 36.4 °C (97.6 °F) (Temporal)   Resp 18   Ht 1.702 m (5' 7\")   Wt 104 kg (229 lb 11.5 oz)   LMP 01/11/2024   SpO2 98%   BMI 35.98 kg/m²     "

## 2024-05-08 NOTE — ED NOTES
Pt discharged to lobby with steady gait. GCS 15. Pt in possession of belongings. Pt provided discharge education and information pertaining to medications and follow up appointments. Pt received copy of discharge instructions and verbalized understanding.     Vitals:    05/08/24 1303   BP: 115/67   Pulse: 89   Resp: 15   Temp: 36.7 °C (98 °F)   SpO2: 97%

## 2024-05-08 NOTE — DISCHARGE INSTRUCTIONS
Continue the Pepcid and Zyrtec.  Recheck in 72 hours with your obstetrician and gynecologist or here in the emergency department and sooner if worse.

## 2024-07-22 ENCOUNTER — HOSPITAL ENCOUNTER (OUTPATIENT)
Facility: MEDICAL CENTER | Age: 29
End: 2024-07-22
Attending: OBSTETRICS & GYNECOLOGY
Payer: COMMERCIAL

## 2024-07-22 LAB
GLUCOSE 1H P 50 G GLC PO SERPL-MCNC: 194 MG/DL (ref 70–139)
HCT VFR BLD AUTO: 34.9 % (ref 37–47)
HGB BLD-MCNC: 11.2 G/DL (ref 12–16)
PLATELET # BLD AUTO: 313 K/UL (ref 164–446)
T PALLIDUM AB SER QL IA: NORMAL

## 2024-07-22 PROCEDURE — 85014 HEMATOCRIT: CPT

## 2024-07-22 PROCEDURE — 82950 GLUCOSE TEST: CPT

## 2024-07-22 PROCEDURE — 36415 COLL VENOUS BLD VENIPUNCTURE: CPT

## 2024-07-22 PROCEDURE — 86780 TREPONEMA PALLIDUM: CPT

## 2024-07-22 PROCEDURE — 85049 AUTOMATED PLATELET COUNT: CPT

## 2024-07-22 PROCEDURE — 85018 HEMOGLOBIN: CPT

## 2024-09-05 ENCOUNTER — HOSPITAL ENCOUNTER (OUTPATIENT)
Facility: MEDICAL CENTER | Age: 29
End: 2024-09-05
Attending: OBSTETRICS & GYNECOLOGY
Payer: COMMERCIAL

## 2024-09-05 PROCEDURE — 87150 DNA/RNA AMPLIFIED PROBE: CPT

## 2024-09-05 PROCEDURE — 87081 CULTURE SCREEN ONLY: CPT

## 2024-09-06 LAB — GP B STREP DNA SPEC QL NAA+PROBE: NEGATIVE

## 2024-09-18 ENCOUNTER — HOSPITAL ENCOUNTER (INPATIENT)
Facility: MEDICAL CENTER | Age: 29
LOS: 2 days | DRG: 833 | End: 2024-09-20
Attending: OBSTETRICS & GYNECOLOGY | Admitting: OBSTETRICS & GYNECOLOGY
Payer: COMMERCIAL

## 2024-09-18 DIAGNOSIS — J10.1 INFLUENZA A: ICD-10-CM

## 2024-09-18 LAB
APPEARANCE UR: CLEAR
BASOPHILS # BLD AUTO: 0.2 % (ref 0–1.8)
BASOPHILS # BLD: 0.02 K/UL (ref 0–0.12)
BILIRUB UR QL STRIP.AUTO: NEGATIVE
COLOR UR AUTO: ABNORMAL
EOSINOPHIL # BLD AUTO: 0.11 K/UL (ref 0–0.51)
EOSINOPHIL NFR BLD: 1 % (ref 0–6.9)
ERYTHROCYTE [DISTWIDTH] IN BLOOD BY AUTOMATED COUNT: 37.3 FL (ref 35.9–50)
FLUAV RNA SPEC QL NAA+PROBE: POSITIVE
FLUBV RNA SPEC QL NAA+PROBE: NEGATIVE
GLUCOSE BLD STRIP.AUTO-MCNC: 103 MG/DL (ref 65–99)
GLUCOSE BLD STRIP.AUTO-MCNC: 104 MG/DL (ref 65–99)
GLUCOSE BLD STRIP.AUTO-MCNC: 110 MG/DL (ref 65–99)
GLUCOSE BLD STRIP.AUTO-MCNC: 122 MG/DL (ref 65–99)
GLUCOSE UR QL STRIP.AUTO: NEGATIVE MG/DL
HCT VFR BLD AUTO: 33.3 % (ref 37–47)
HGB BLD-MCNC: 11 G/DL (ref 12–16)
IMM GRANULOCYTES # BLD AUTO: 0.14 K/UL (ref 0–0.11)
IMM GRANULOCYTES NFR BLD AUTO: 1.2 % (ref 0–0.9)
KETONES UR QL STRIP.AUTO: >=160 MG/DL
LACTATE SERPL-SCNC: 1.4 MMOL/L (ref 0.5–2)
LACTATE SERPL-SCNC: 1.7 MMOL/L (ref 0.5–2)
LEUKOCYTE ESTERASE UR QL STRIP.AUTO: NEGATIVE
LYMPHOCYTES # BLD AUTO: 0.21 K/UL (ref 1–4.8)
LYMPHOCYTES NFR BLD: 1.9 % (ref 22–41)
MCH RBC QN AUTO: 26.8 PG (ref 27–33)
MCHC RBC AUTO-ENTMCNC: 33 G/DL (ref 32.2–35.5)
MCV RBC AUTO: 81.2 FL (ref 81.4–97.8)
MONOCYTES # BLD AUTO: 0.57 K/UL (ref 0–0.85)
MONOCYTES NFR BLD AUTO: 5.1 % (ref 0–13.4)
NEUTROPHILS # BLD AUTO: 10.22 K/UL (ref 1.82–7.42)
NEUTROPHILS NFR BLD: 90.6 % (ref 44–72)
NITRITE UR QL STRIP.AUTO: NEGATIVE
NRBC # BLD AUTO: 0 K/UL
NRBC BLD-RTO: 0 /100 WBC (ref 0–0.2)
PH UR STRIP.AUTO: 6.5 [PH] (ref 5–8)
PLATELET # BLD AUTO: 293 K/UL (ref 164–446)
PMV BLD AUTO: 9.3 FL (ref 9–12.9)
PROT UR QL STRIP: NEGATIVE MG/DL
RBC # BLD AUTO: 4.1 M/UL (ref 4.2–5.4)
RBC UR QL AUTO: NEGATIVE
RSV RNA SPEC QL NAA+PROBE: NEGATIVE
SARS-COV-2 RNA RESP QL NAA+PROBE: NOTDETECTED
SP GR UR STRIP.AUTO: 1.02 (ref 1–1.03)
SPECIMEN SOURCE: ABNORMAL
T4 SERPL-MCNC: 9.4 UG/DL (ref 4–12)
TSH SERPL-ACNC: 0.67 UIU/ML (ref 0.35–5.5)
UROBILINOGEN UR STRIP.AUTO-MCNC: 0.2 MG/DL
WBC # BLD AUTO: 11.3 K/UL (ref 4.8–10.8)

## 2024-09-18 PROCEDURE — A9270 NON-COVERED ITEM OR SERVICE: HCPCS | Mod: UD | Performed by: OBSTETRICS & GYNECOLOGY

## 2024-09-18 PROCEDURE — 87040 BLOOD CULTURE FOR BACTERIA: CPT | Mod: 91

## 2024-09-18 PROCEDURE — 84436 ASSAY OF TOTAL THYROXINE: CPT

## 2024-09-18 PROCEDURE — 700111 HCHG RX REV CODE 636 W/ 250 OVERRIDE (IP): Performed by: OBSTETRICS & GYNECOLOGY

## 2024-09-18 PROCEDURE — 700105 HCHG RX REV CODE 258: Performed by: OBSTETRICS & GYNECOLOGY

## 2024-09-18 PROCEDURE — 0241U HCHG SARS-COV-2 COVID-19 NFCT DS RESP RNA 4 TRGT MIC: CPT

## 2024-09-18 PROCEDURE — 99282 EMERGENCY DEPT VISIT SF MDM: CPT

## 2024-09-18 PROCEDURE — 94640 AIRWAY INHALATION TREATMENT: CPT

## 2024-09-18 PROCEDURE — 82962 GLUCOSE BLOOD TEST: CPT | Mod: 91

## 2024-09-18 PROCEDURE — 83605 ASSAY OF LACTIC ACID: CPT | Mod: 91

## 2024-09-18 PROCEDURE — 700102 HCHG RX REV CODE 250 W/ 637 OVERRIDE(OP): Mod: UD | Performed by: OBSTETRICS & GYNECOLOGY

## 2024-09-18 PROCEDURE — 84443 ASSAY THYROID STIM HORMONE: CPT

## 2024-09-18 PROCEDURE — 700101 HCHG RX REV CODE 250

## 2024-09-18 PROCEDURE — 81002 URINALYSIS NONAUTO W/O SCOPE: CPT

## 2024-09-18 PROCEDURE — 36415 COLL VENOUS BLD VENIPUNCTURE: CPT

## 2024-09-18 PROCEDURE — 770002 HCHG ROOM/CARE - OB PRIVATE (112)

## 2024-09-18 PROCEDURE — 85025 COMPLETE CBC W/AUTO DIFF WBC: CPT

## 2024-09-18 RX ORDER — ALBUTEROL SULFATE 5 MG/ML
2.5 SOLUTION RESPIRATORY (INHALATION)
Status: DISCONTINUED | OUTPATIENT
Start: 2024-09-18 | End: 2024-09-20 | Stop reason: HOSPADM

## 2024-09-18 RX ORDER — ALBUTEROL SULFATE 5 MG/ML
SOLUTION RESPIRATORY (INHALATION)
Status: COMPLETED
Start: 2024-09-18 | End: 2024-09-18

## 2024-09-18 RX ORDER — ACETAMINOPHEN 500 MG
1000 TABLET ORAL EVERY 6 HOURS PRN
Status: DISCONTINUED | OUTPATIENT
Start: 2024-09-18 | End: 2024-09-18

## 2024-09-18 RX ORDER — OSELTAMIVIR PHOSPHATE 75 MG/1
75 CAPSULE ORAL 2 TIMES DAILY
Status: DISCONTINUED | OUTPATIENT
Start: 2024-09-18 | End: 2024-09-20 | Stop reason: HOSPADM

## 2024-09-18 RX ORDER — ACETAMINOPHEN 10 MG/ML
1000 INJECTION, SOLUTION INTRAVENOUS EVERY 6 HOURS
Status: COMPLETED | OUTPATIENT
Start: 2024-09-18 | End: 2024-09-19

## 2024-09-18 RX ORDER — SODIUM CHLORIDE, SODIUM LACTATE, POTASSIUM CHLORIDE, AND CALCIUM CHLORIDE .6; .31; .03; .02 G/100ML; G/100ML; G/100ML; G/100ML
500 INJECTION, SOLUTION INTRAVENOUS ONCE
Status: ACTIVE | OUTPATIENT
Start: 2024-09-18 | End: 2024-09-19

## 2024-09-18 RX ORDER — SODIUM CHLORIDE, SODIUM LACTATE, POTASSIUM CHLORIDE, CALCIUM CHLORIDE 600; 310; 30; 20 MG/100ML; MG/100ML; MG/100ML; MG/100ML
INJECTION, SOLUTION INTRAVENOUS CONTINUOUS
Status: DISCONTINUED | OUTPATIENT
Start: 2024-09-18 | End: 2024-09-20 | Stop reason: HOSPADM

## 2024-09-18 RX ADMIN — ALBUTEROL SULFATE 2.5 MG: 2.5 SOLUTION RESPIRATORY (INHALATION) at 13:54

## 2024-09-18 RX ADMIN — OSELTAMIVIR PHOSPHATE 75 MG: 75 CAPSULE ORAL at 05:01

## 2024-09-18 RX ADMIN — ACETAMINOPHEN 1000 MG: 500 TABLET ORAL at 08:21

## 2024-09-18 RX ADMIN — SODIUM CHLORIDE, POTASSIUM CHLORIDE, SODIUM LACTATE AND CALCIUM CHLORIDE: 600; 310; 30; 20 INJECTION, SOLUTION INTRAVENOUS at 18:50

## 2024-09-18 RX ADMIN — ACETAMINOPHEN 1000 MG: 500 TABLET ORAL at 14:20

## 2024-09-18 RX ADMIN — SODIUM CHLORIDE, POTASSIUM CHLORIDE, SODIUM LACTATE AND CALCIUM CHLORIDE: 600; 310; 30; 20 INJECTION, SOLUTION INTRAVENOUS at 06:00

## 2024-09-18 RX ADMIN — ACETAMINOPHEN 1000 MG: 1000 INJECTION, SOLUTION INTRAVENOUS at 22:06

## 2024-09-18 RX ADMIN — ACETAMINOPHEN 1000 MG: 500 TABLET ORAL at 02:20

## 2024-09-18 RX ADMIN — OSELTAMIVIR PHOSPHATE 75 MG: 75 CAPSULE ORAL at 18:21

## 2024-09-18 RX ADMIN — SODIUM CHLORIDE, POTASSIUM CHLORIDE, SODIUM LACTATE AND CALCIUM CHLORIDE: 600; 310; 30; 20 INJECTION, SOLUTION INTRAVENOUS at 14:13

## 2024-09-18 ASSESSMENT — PAIN DESCRIPTION - PAIN TYPE
TYPE: ACUTE PAIN

## 2024-09-18 ASSESSMENT — PATIENT HEALTH QUESTIONNAIRE - PHQ9
2. FEELING DOWN, DEPRESSED, IRRITABLE, OR HOPELESS: NOT AT ALL
SUM OF ALL RESPONSES TO PHQ9 QUESTIONS 1 AND 2: 0
1. LITTLE INTEREST OR PLEASURE IN DOING THINGS: NOT AT ALL

## 2024-09-18 NOTE — PROGRESS NOTES
Called by RN for maternal  temp of 101.2  , maternal pulse 140 bpm and fetal heart rate 180s.  Pt given 1000 mg Tylenol  PO and fluid bolus. BC X 2 drawn. TSH and T4  drawn and were normal.  Lactic acid 1.8.  Fetal  heart rate now 150s - 160s.  Spoke with Pharmacy about changing to IV Ofirmev - they will approve 24 hours of  scheduled 1000 gm Q 6 hours.

## 2024-09-18 NOTE — PROGRESS NOTES
0430 - Report received from Kathleen GABRIEL, pt transferred to S224 for antepartum admission.    0700 - Report given to Lidia GABRIEL, care transferred.

## 2024-09-18 NOTE — ED PROVIDER NOTES
"See dictated H & P    Ruel Guajardo   35w6d      Subjective:  Pt with cough since Monday and fever of 103.0 last night at 8:00 pm. Pt was around her nephew who had influenza. Pt Uterine contractions:no  Pain: No    Objective:   Vitals:    09/18/24 0149 09/18/24 0200 09/18/24 0230   BP:  129/55    Pulse:  (!) 131    Resp: 16     Temp: (!) 38.3 °C (100.9 °F)  38 °C (100.4 °F)   TempSrc: Temporal  Temporal   Weight: 113 kg (249 lb)     Height: 1.702 m (5' 7\")       Gen: NAD  FHT: 150's cat 1  Quesada: irritable  Lungs: CTA B  CV: tachycardia, no murmur  Back: no CVA tenderness  Abd: soft, gravid  Ext: 1+ edema        Membranes ruptured: .no        Labs:  Recent Results (from the past 24 hour(s))   CoV-2, Flu A/B, And RSV by PCR ([facility])    Collection Time: 09/18/24  2:17 AM    Specimen: Nasopharyngeal; Respirate   Result Value Ref Range    Influenza virus A RNA POSITIVE (A) Negative    Influenza virus B, PCR Negative Negative    RSV, PCR Negative Negative    SARS-CoV-2 by PCR NotDetected     SARS-CoV-2 Source NP Swab    POCT urinalysis device results    Collection Time: 09/18/24  3:03 AM   Result Value Ref Range    POC Color Dark yellow     POC Appearance Clear     POC Glucose Negative Negative mg/dL    POC Ketones >=160 (A) Negative mg/dL    POC Specific Gravity 1.020 1.005 - 1.030    POC Blood Negative Negative    POC Urine PH 6.5 5.0 - 8.0    POC Protein Negative Negative mg/dL    Bilirubin Negative Negative    Urobilinogen, Urine 0.2 Negative    POC Nitrites Negative Negative    POC Leukocyte Esterase Negative Negative       Assessment:   35w6d/Influenza A-admit. Stat IV fluids LR at 125 cc/hour. Continue with Tylenol 1 gram po q 8 hours. Start Tamiflu per protocol. CBC pending.   A1DM- check FSBS fasting and 1 hr PP.  GBBS negative  H/O of asthma  Fetal status-reasuring    "

## 2024-09-18 NOTE — CARE PLAN
The patient is Watcher - Medium risk of patient condition declining or worsening    Shift Goals  Clinical Goals: manage fever/ flu s/s, monitor fetal status  Patient Goals: rest, healthy baby  Family Goals: support      Problem: Knowledge Deficit - L&D  Goal: Patient and family/caregivers will demonstrate understanding of plan of care, disease process/condition, diagnostic tests and medications  Outcome: Progressing   POC discussed with pt. Pt verbalizes understanding and asks questions as needed.    Problem: Respiratory  Goal: Patient will achieve/maintain optimum respiratory ventilation and gas exchange  Outcome: Progressing   Continuous pulse ox applied. Oxygen available as needed. Pt educated and encouraged to use incentive spirometer.     Problem: Infection - Standard  Goal: Patient will remain free from infection  Outcome: Progressing   Frequent temperature checks as ordered. Assessed for s/s of fever/flu. Monitor and reviewed VS.  Assess for FHR changes associated with infection and distress d/t fever. Antipyretic administered as ordered. Comfort measures provided.

## 2024-09-18 NOTE — PROGRESS NOTES
0700 - Report received from HARVEY Fall.  0820 - Pt reports +FM and occ mild cramps. Pt denies LOF/ VB/ pain.  1250 - Dr. Carlin at BS to assess. This RN updated MD on pt status/ VS. Orders received.  1430 - This RN left message with Dr. Carlin notifying of pt tachycardia.  1437 - MD returned call. Orders received.   1442 - New EFM in place. Broken tracing d/t pulse ox interference.  - 190 bpm. Occ decels audible to this RN at BS.   1506 - Pt temp 101.2f. This RN updated Dr. Carlin of pt temp and FHR. Orders received.

## 2024-09-18 NOTE — PROGRESS NOTES
S: Pt still feeling ill    O: Tm 100.4 at 0230 hours 9/18/24  Tc 97.6  P 120  /65  LUNGS: with expiratory wheezes  ABD: Gravid, NT  FHTs 150s - 160s  [place] occasional     1 hour .    A/P:  IUP at 35 6/7 weeks, flu , GDMA1 - fair   Flu:  Is on Tamiflu.  Still tachycardic   Last temp 100/4 at 0230 hours. Continue Tylenol, Tamiful.  Keep hospitalized until afebrile > 24 hours and has normal heart rate   FWB: Overall reassuring.  GDMA1 - Continue checking FBS and 1 hour PP. FBS was elevated this morning.

## 2024-09-18 NOTE — PROGRESS NOTES
Pt presents to [facility] c/o cough, fever and body aches that started on momday. Pt has no OB complaints. Pt denies vaginal bleeding, leaking or ctx. FM+. POC discussed. Pt placed onmonitos x2.    0210- dr moeller given report. See orders    0320- dr moeller at bedside

## 2024-09-18 NOTE — H&P
DATE OF ADMISSION:  2024     ADMITTING DIAGNOSES:  1.  Intrauterine pregnancy at 35 weeks and 6 days.  2.  Cough and fever/Influenza A  3.  A1 diabetes.     HISTORY OF PRESENT ILLNESS:  This patient is a 29-year-old  2, para 0   female who is 35 weeks and 6 days who has had prenatal care with Dr. Melissa Carlin, has been complicated by gestational diabetes, diet controlled.  The   patient is coming in with complaint of a cough that started since Monday,   nonproductive and fever of 103.0 yesterday at 8:00 p.m.  The patient states   she has been feeling like she had the flu, especially since yesterday.  She   was around her nephew that was diagnosed and admitted with influenza.  The   patient states she took Tylenol at home but when her temperature was 103.0   last night at 8:00 p.m., she decided to come to labor and delivery.  She   denies any painful urination.  She does have urinary frequency but no pain   with urination.  The patient denies any contractions or any leaking of fluid.     PAST MEDICAL HISTORY:  1.  Asthma.  2.  Gestational diabetes.  3.  Migraines.     PAST SURGICAL HISTORY:  Closure of intraoral laceration 2019 and D and E   on 10/26/2016.     MEDICATIONS:  1.  She is on prenatal vitamins.  2.  Tylenol as needed.  3.  Albuterol as needed.     ALLERGIES:  No known drug allergies.     OBSTETRICAL HISTORY:  She is a  2, para 0.  First pregnancy 10/26/2016   was D and E.     GYNECOLOGIC HISTORY:  The patient started menstruating at age 11, has   menstrual cycles every 28 days, lasts 5 days.  Her last menstrual cycle was on   2024.     SOCIAL HISTORY:  The patient is .  Denies tobacco, alcohol or drug use.     PHYSICAL EXAMINATION:  VITAL SIGNS:  Blood pressure 129/55, her heart rate is 131.  Current   temperature 100.9.  GENERAL:  Pleasant female in no acute distress.  LUNGS:  Clear to auscultation bilaterally.  CARDIOVASCULAR:  Tachycardia.  No murmur.  ABDOMEN:   Soft, gravid, nontender.  BACK:  No CVA tenderness.  EXTREMITIES:  No calf tenderness.  PELVIC:  Fetal heart tones 150s-160s, category 1.  [place] irritability.  Sterile   vaginal exam defer.     LABORATORY DATA:  Influenza A positive.  GBS is negative.  One-hour glucose   194.  H and H 11.2 and 34.9.  RPR nonreactive, A positive, HIV nonreactive,   hepatitis B surface antigen nonreactive, hepatitis C nonreactive, RPR   nonreactive, A positive, chlamydia and gonorrhea negative.     ASSESSMENT AND PLAN:  A 29-year-old  2, para 0 at 35 weeks and 6 days.  1.  Fever, cough and influenza A.  The patient will be admitted overnight to   monitor.  We will start her on Tamiflu.  We will continue with p.o. hydration.    CBC is pending.  Her urinalysis is negative.  The rest of the viral culture   is pending as well.  We will start the patient on Tamiflu per protocol and we   will observe overnight.  2.  History of asthma.  3.  Gestational diabetes.  We will check fingerstick blood sugars, fasting and   1-hour postprandials and we will start an ADA diet.  4.  Fetal status is reassuring.        ______________________________  MD THEA ALLENH/KANCHAN    DD:  2024 03:45  DT:  2024 04:15    Job#:  [id number]    CC:MAME GUERRA MD

## 2024-09-19 LAB
GLUCOSE BLD STRIP.AUTO-MCNC: 109 MG/DL (ref 65–99)
GLUCOSE BLD STRIP.AUTO-MCNC: 125 MG/DL (ref 65–99)
GLUCOSE BLD STRIP.AUTO-MCNC: 131 MG/DL (ref 65–99)
GLUCOSE BLD STRIP.AUTO-MCNC: 85 MG/DL (ref 65–99)
LACTATE SERPL-SCNC: 1 MMOL/L (ref 0.5–2)
LACTATE SERPL-SCNC: 1.3 MMOL/L (ref 0.5–2)

## 2024-09-19 PROCEDURE — 82962 GLUCOSE BLOOD TEST: CPT | Mod: 91

## 2024-09-19 PROCEDURE — A9270 NON-COVERED ITEM OR SERVICE: HCPCS | Performed by: OBSTETRICS & GYNECOLOGY

## 2024-09-19 PROCEDURE — 770002 HCHG ROOM/CARE - OB PRIVATE (112)

## 2024-09-19 PROCEDURE — 700105 HCHG RX REV CODE 258: Performed by: OBSTETRICS & GYNECOLOGY

## 2024-09-19 PROCEDURE — 700111 HCHG RX REV CODE 636 W/ 250 OVERRIDE (IP): Performed by: OBSTETRICS & GYNECOLOGY

## 2024-09-19 PROCEDURE — 83605 ASSAY OF LACTIC ACID: CPT

## 2024-09-19 PROCEDURE — 700101 HCHG RX REV CODE 250: Performed by: OBSTETRICS & GYNECOLOGY

## 2024-09-19 PROCEDURE — 700102 HCHG RX REV CODE 250 W/ 637 OVERRIDE(OP): Performed by: OBSTETRICS & GYNECOLOGY

## 2024-09-19 PROCEDURE — 36415 COLL VENOUS BLD VENIPUNCTURE: CPT

## 2024-09-19 PROCEDURE — 94640 AIRWAY INHALATION TREATMENT: CPT

## 2024-09-19 PROCEDURE — 302790 HCHG STAT ANTEPARTUM CARE, DAILY

## 2024-09-19 PROCEDURE — 59025 FETAL NON-STRESS TEST: CPT

## 2024-09-19 RX ADMIN — OSELTAMIVIR PHOSPHATE 75 MG: 75 CAPSULE ORAL at 06:06

## 2024-09-19 RX ADMIN — ACETAMINOPHEN 1000 MG: 1000 INJECTION, SOLUTION INTRAVENOUS at 16:39

## 2024-09-19 RX ADMIN — ALBUTEROL SULFATE 2.5 MG: 2.5 SOLUTION RESPIRATORY (INHALATION) at 21:59

## 2024-09-19 RX ADMIN — ACETAMINOPHEN 1000 MG: 1000 INJECTION, SOLUTION INTRAVENOUS at 10:38

## 2024-09-19 RX ADMIN — ALBUTEROL SULFATE 2.5 MG: 2.5 SOLUTION RESPIRATORY (INHALATION) at 11:20

## 2024-09-19 RX ADMIN — SODIUM CHLORIDE, POTASSIUM CHLORIDE, SODIUM LACTATE AND CALCIUM CHLORIDE: 600; 310; 30; 20 INJECTION, SOLUTION INTRAVENOUS at 10:37

## 2024-09-19 RX ADMIN — ACETAMINOPHEN 1000 MG: 1000 INJECTION, SOLUTION INTRAVENOUS at 03:58

## 2024-09-19 RX ADMIN — OSELTAMIVIR PHOSPHATE 75 MG: 75 CAPSULE ORAL at 18:34

## 2024-09-19 RX ADMIN — SODIUM CHLORIDE, POTASSIUM CHLORIDE, SODIUM LACTATE AND CALCIUM CHLORIDE: 600; 310; 30; 20 INJECTION, SOLUTION INTRAVENOUS at 02:40

## 2024-09-19 ASSESSMENT — PAIN DESCRIPTION - PAIN TYPE
TYPE: ACUTE PAIN

## 2024-09-19 NOTE — PROGRESS NOTES
1900 - Report received from Lidia GABRIEL at , care assumed. Cohen Gestation today at 35-6 Weeks    2020- Pt reports +FM, -VB, -LOF, +mild cramping.   2300- Raegan TARIQ notified in department of fetal and maternal tachycardia and VS. MD aware.   1915- Report given to Radha GABRIEL. POC discussed. Care relinquished.

## 2024-09-19 NOTE — PROGRESS NOTES
HD #1    S:  Pt feeling much better.  Still with cough.  No VB or LOF. . Discussed recommendation to stay until tomorrow.  Pt agrees    O: Tc 97.3 Tm 101.2 9/18/24 at 1500 hours P 95  - 125  /56  LUNGS  - RT examined  ABD: Gravid, NT  EXT: Trace pedal edema,  FHTs now in 130s - 140s Cat 1  Combs no contractions  FBS 85  Lactic acid  1  BC in process  Tamiflu  Ofirmev    A/P;  IUP at 36 weeks, GDMA1, influenza - improved   Infuenza:  Continue Tamiflu.  Pt has improved.  F/U on BC. Plan for D/C home tomorrow  GDMA1 - BS under good control  FWB:  FHTs now reassuring since mother is not tachycardic.  Change to NST Q shift

## 2024-09-19 NOTE — CARE PLAN
The patient is Watcher - Medium risk of patient condition declining or worsening    Shift Goals  Clinical Goals: Manage fever/flu; Monitor FHT  Patient Goals: Healthy Mom; Healthy Baby  Family Goals: Comfort and Support    Progress made toward(s) clinical / shift goals:      Problem: Psychosocial - L&D  Goal: Patient's level of anxiety will decrease  Outcome: Progressing     Problem: Pain  Goal: Patient's pain will be alleviated or reduced to the patient’s comfort goal  Outcome: Progressing     Problem: Risk for Fluid Imbalance  Goal: Patient's fluid volume balance will be maintained or improve  Outcome: Progressing     Problem: Risk for Injury  Goal: Patient and fetus will be free of preventable injury/complications  Outcome: Progressing       Patient is not progressing towards the following goals:

## 2024-09-20 ENCOUNTER — PHARMACY VISIT (OUTPATIENT)
Dept: PHARMACY | Facility: MEDICAL CENTER | Age: 29
End: 2024-09-20
Payer: MEDICARE

## 2024-09-20 VITALS
HEIGHT: 67 IN | OXYGEN SATURATION: 99 % | WEIGHT: 249 LBS | HEART RATE: 93 BPM | RESPIRATION RATE: 18 BRPM | TEMPERATURE: 97.1 F | BODY MASS INDEX: 39.08 KG/M2 | SYSTOLIC BLOOD PRESSURE: 109 MMHG | DIASTOLIC BLOOD PRESSURE: 64 MMHG

## 2024-09-20 LAB
GLUCOSE BLD STRIP.AUTO-MCNC: 113 MG/DL (ref 65–99)
GLUCOSE BLD STRIP.AUTO-MCNC: 73 MG/DL (ref 65–99)
GLUCOSE BLD STRIP.AUTO-MCNC: 99 MG/DL (ref 65–99)

## 2024-09-20 PROCEDURE — 94640 AIRWAY INHALATION TREATMENT: CPT

## 2024-09-20 PROCEDURE — 59025 FETAL NON-STRESS TEST: CPT

## 2024-09-20 PROCEDURE — 700102 HCHG RX REV CODE 250 W/ 637 OVERRIDE(OP): Performed by: OBSTETRICS & GYNECOLOGY

## 2024-09-20 PROCEDURE — RXMED WILLOW AMBULATORY MEDICATION CHARGE: Performed by: OBSTETRICS & GYNECOLOGY

## 2024-09-20 PROCEDURE — A9270 NON-COVERED ITEM OR SERVICE: HCPCS | Performed by: OBSTETRICS & GYNECOLOGY

## 2024-09-20 PROCEDURE — 700101 HCHG RX REV CODE 250: Performed by: OBSTETRICS & GYNECOLOGY

## 2024-09-20 PROCEDURE — 82962 GLUCOSE BLOOD TEST: CPT

## 2024-09-20 RX ORDER — OSELTAMIVIR PHOSPHATE 75 MG/1
75 CAPSULE ORAL 2 TIMES DAILY
Qty: 6 CAPSULE | Refills: 0 | Status: ACTIVE | OUTPATIENT
Start: 2024-09-20 | End: 2024-09-23

## 2024-09-20 RX ADMIN — ALBUTEROL SULFATE 2.5 MG: 2.5 SOLUTION RESPIRATORY (INHALATION) at 13:36

## 2024-09-20 RX ADMIN — OSELTAMIVIR PHOSPHATE 75 MG: 75 CAPSULE ORAL at 06:27

## 2024-09-20 NOTE — CARE PLAN
The patient is Stable - Low risk of patient condition declining or worsening    Shift Goals  Clinical Goals: vs wnl and decrease O2 needs  Patient Goals: rest, feel better  Family Goals: support    Progress made toward(s) clinical / shift goals:    Problem: Knowledge Deficit - L&D  Goal: Patient and family/caregivers will demonstrate understanding of plan of care, disease process/condition, diagnostic tests and medications  9/19/2024 2029 by Janis Rodriguez R.N.  Outcome: Progressing  Note: verbalizes  9/19/2024 2029 by Janis Rodriguez R.N.  Outcome: Progressing     Problem: Respiratory  Goal: Patient will achieve/maintain optimum respiratory ventilation and gas exchange  9/19/2024 2029 by Janis Rodriguez R.N.  Outcome: Progressing  Flowsheets  Taken 9/19/2024 2029 by Janis Rodriguez R.N.  O2 Delivery Device: None - Room Air  Taken 9/18/2024 0820 by Lidia Dugan R.N.  Incentive Spirometer: Self Motivated  Note: Maintaining O2 sat wnl without NC  9/19/2024 2029 by Janis Rodriguez R.N.  Outcome: Progressing     Problem: Psychosocial - L&D  Goal: Patient's ability to re-evaluate and adapt role responsibilities will improve  Outcome: Progressing     Problem: Cardiac Output  Goal: Patient will remain normotensive throughout hospitalization  Outcome: Progressing     Problem: Risk for Venous Thromboembolism (VTE)  Goal: VTE prevention measures will be implemented and patient will remain free from VTE  9/19/2024 2029 by Janis Rodriguez R.N.  Outcome: Progressing  Note: Scds on  9/19/2024 2029 by Janis Rodriguez R.N.  Outcome: Progressing     Problem: Discharge Barriers/Planning  Goal: Patient's continuum of care needs are met  Outcome: Progressing

## 2024-09-20 NOTE — PROGRESS NOTES
0700: Received report from Aury GABRIEL, plan of care discussed. Call light In reach when in need of assistance. Orders from Dr. Carlin for NST Q shift and take off continuous monitoring. Care plan: Goal: O2Sat will improve and maintain above 95% on room air. Goal: Pt will remain afebrile, Routine VS and temps, pt aware of s/s of temp, scheduled tylenol.     0815: RN at bedside to assess pt, lung sounds assessed, crackles anf wheezes heard on pts right side, pt states that is where crackles have been heard previously. Plan for Rt to come to bedside to assess. Pt states feeling much better today than she did yesterday. Plan to titrate Oxygen via nasal cannula down today from 2L. Pt given Incentive spirometer and educated on how to use it, pt aware to use every hour or during commercial breaks while watching tv. Pt educated on plan to titrate O2 down, Continuous pulseOx on, monitor set to beep if O2 Sat dips below 95%, pt aware to take deep breaths and to put O2 back on if beeping is continuous.     0820: RN requesting RT to bedside to assess lung sounds, per Dr. Arana if RT hears crackles, orders for a chest xray. RT at bedside to assess. RT says she also heard crackles but that it was greatly improved from the day before. MD notified of RN and RT assessment. Per Dr. Arana, if pts assessment has improved no need for chest xray. Per Dr. Arana, notify MD if blood sugar is above 130.     1310: Blood sugar 131, Md notified, ok with result, no new orders at this time.     1800: Pt sitting up in bed, room air, pt states she has been off of O2 the past few hours and has been feeling great, hasn't heard monitor beep at her. Pt aware that she may need to wear O2 at night if while sleeping she decreases down below 95%.     1900: Report given to Janis GABRIEL, plan of care discussed. Orders from dr. Arana to saline lock IV while not infusing medications.

## 2024-09-20 NOTE — PROGRESS NOTES
0700- report received from Janis GABRIEL. Plan of care discussed.     0900- assessment done. Patient denies any contractions, leaking of fluid or any vaginal bleeding. States positive fetal movement. Patient states she is feeling better. Denies any shortness of breath.     1030- Dr Carlin updated. Orders previous received to discharge.     1315- meds from pharmacy at bedside and given to patient. IV discontinued.  Patient given labor precautions as well as influenza in pregnancy. Patient to continue with kick counts. Patient states understanding of when to return to L&D. Patient will call for follow up with Raegan next week as well as high risk.     1335- Patient discharged home with fob  in stable condition via wheel chair.

## 2024-09-20 NOTE — PROGRESS NOTES
HD #2    S:  Pt doing much better.  Ready to go home    O: T 97  P 97  /64  ABD: Gravid, NT  EXT: Trace pedal edema, no cords or HOmans  Reactive NST  BC and UC no growth  FBS 73  1 hour -131  Tamiflu    A/P:  IUP at 36 1/7 weeks, Influenza - improved   D/C home  Complete course of Tamiflu  F/U [facility] and with me as scheduled  GDMA1 - Continue diet and checking sugars as directed

## 2024-09-20 NOTE — DISCHARGE SUMMARY
DATE OF ADMISSION:  2024   DATE OF DISCHARGE:  2024     ADMITTING DIAGNOSES:    1.  Intrauterine pregnancy at 35 and 6/7th weeks.  2.  Influenza A.  3.  Gestational diabetes mellitus type A1.     Please see previously dictated history and physical.     HOSPITAL COURSE:  The patient was admitted to labor and delivery on 2024   with the above diagnoses.  She was started on Tamiflu and IV acetaminophen.    She had blood cultures and urine cultures performed, which were negative   secondary to a temperature elevation of 101.5.  On hospital day #2, she had   been afebrile for greater than 36 hours, her heart rate had improved and her   oxygen requirement was nonexistent.  She did get several breathing treatments   with respiratory therapy secondary to history of asthma.  The patient denied   any vaginal bleeding or loss of fluid.  Her blood sugars have been under good   control during her hospitalization.     PHYSICAL EXAMINATION ON DISCHARGE:    VITAL SIGNS:  Temperature is 97, pulse 97, blood pressure 109/64.  ABDOMEN:  Gravid and nontender.  EXTREMITIES:  Show trace pedal edema.  She has no cords or Homans sign.     LABORATORY DATA:  Blood cultures and urine cultures showed no growth.  She had   a reactive NST.  Fasting blood sugar was 73, 1-hour postprandials ranged from   109-131 and she is completing her second day of Tamiflu.     DISCHARGE DIAGNOSES:    1.  Intrauterine pregnancy at 36 and 1/7th weeks.  2.  Influenza.  3.  Gestational diabetes mellitus type A1.     DISCHARGE INSTRUCTIONS:    1.  The patient will be discharged home with instructions to follow up with   Dr. Carlin in 1 week.  2.  She is instructed to follow [facility] with [facility] as   scheduled for her gestational diabetes mellitus type A1.  3.  She shall complete 3 more days of Tamiflu.  4.   labor precautions have been reviewed.        ______________________________  MD SHERMAN JOHNSTON/MARY/    DD:   09/20/2024 08:44  DT:  09/20/2024 09:06    Job#:  [id number]

## 2024-09-23 LAB
BACTERIA BLD CULT: NORMAL
BACTERIA BLD CULT: NORMAL
SIGNIFICANT IND 70042: NORMAL
SIGNIFICANT IND 70042: NORMAL
SITE SITE: NORMAL
SITE SITE: NORMAL
SOURCE SOURCE: NORMAL
SOURCE SOURCE: NORMAL

## 2024-09-29 DIAGNOSIS — J45.20 MILD INTERMITTENT ASTHMA WITHOUT COMPLICATION: ICD-10-CM

## 2024-09-29 DIAGNOSIS — J98.01 COUGH DUE TO BRONCHOSPASM: ICD-10-CM

## 2024-09-30 RX ORDER — ALBUTEROL SULFATE 90 UG/1
2 INHALANT RESPIRATORY (INHALATION) EVERY 4 HOURS PRN
Qty: 1 EACH | Refills: 11 | Status: SHIPPED | OUTPATIENT
Start: 2024-09-30

## 2024-09-30 NOTE — TELEPHONE ENCOUNTER
Received request via: Patient    Was the patient seen in the last year in this department? Yes    Does the patient have an active prescription (recently filled or refills available) for medication(s) requested? No    Pharmacy Name: [facility] [id number]    Does the patient have correction Plus and need 100-day supply? (This applies to ALL medications) Patient does not have SCP

## 2024-10-22 ENCOUNTER — HOSPITAL ENCOUNTER (INPATIENT)
Facility: MEDICAL CENTER | Age: 29
LOS: 2 days | End: 2024-10-24
Attending: OBSTETRICS & GYNECOLOGY | Admitting: OBSTETRICS & GYNECOLOGY
Payer: COMMERCIAL

## 2024-10-22 ENCOUNTER — ANESTHESIA EVENT (OUTPATIENT)
Dept: ANESTHESIOLOGY | Facility: MEDICAL CENTER | Age: 29
End: 2024-10-22
Payer: COMMERCIAL

## 2024-10-22 ENCOUNTER — ANESTHESIA (OUTPATIENT)
Dept: ANESTHESIOLOGY | Facility: MEDICAL CENTER | Age: 29
End: 2024-10-22
Payer: COMMERCIAL

## 2024-10-22 ENCOUNTER — APPOINTMENT (OUTPATIENT)
Dept: RADIOLOGY | Facility: MEDICAL CENTER | Age: 29
End: 2024-10-22
Attending: OBSTETRICS & GYNECOLOGY
Payer: COMMERCIAL

## 2024-10-22 DIAGNOSIS — R52 POSTPARTUM PAIN: ICD-10-CM

## 2024-10-22 DIAGNOSIS — D62 ANEMIA ASSOCIATED WITH ACUTE BLOOD LOSS: ICD-10-CM

## 2024-10-22 LAB
A1 MICROGLOB PLACENTAL VAG QL: POSITIVE
ALBUMIN SERPL BCP-MCNC: 3.5 G/DL (ref 3.2–4.9)
ALBUMIN/GLOB SERPL: 0.9 G/DL
ALP SERPL-CCNC: 118 U/L (ref 30–99)
ALT SERPL-CCNC: 9 U/L (ref 2–50)
ANION GAP SERPL CALC-SCNC: 13 MMOL/L (ref 7–16)
AST SERPL-CCNC: 22 U/L (ref 12–45)
BASOPHILS # BLD AUTO: 0.3 % (ref 0–1.8)
BASOPHILS # BLD: 0.03 K/UL (ref 0–0.12)
BILIRUB SERPL-MCNC: 0.3 MG/DL (ref 0.1–1.5)
BUN SERPL-MCNC: 8 MG/DL (ref 8–22)
CALCIUM ALBUM COR SERPL-MCNC: 9.2 MG/DL (ref 8.5–10.5)
CALCIUM SERPL-MCNC: 8.8 MG/DL (ref 8.5–10.5)
CHLORIDE SERPL-SCNC: 102 MMOL/L (ref 96–112)
CO2 SERPL-SCNC: 17 MMOL/L (ref 20–33)
CREAT SERPL-MCNC: 0.53 MG/DL (ref 0.5–1.4)
EOSINOPHIL # BLD AUTO: 0.22 K/UL (ref 0–0.51)
EOSINOPHIL NFR BLD: 1.9 % (ref 0–6.9)
ERYTHROCYTE [DISTWIDTH] IN BLOOD BY AUTOMATED COUNT: 39.3 FL (ref 35.9–50)
GFR SERPLBLD CREATININE-BSD FMLA CKD-EPI: 127 ML/MIN/1.73 M 2
GLOBULIN SER CALC-MCNC: 4 G/DL (ref 1.9–3.5)
GLUCOSE BLD STRIP.AUTO-MCNC: 103 MG/DL (ref 65–99)
GLUCOSE BLD STRIP.AUTO-MCNC: 104 MG/DL (ref 65–99)
GLUCOSE BLD STRIP.AUTO-MCNC: 111 MG/DL (ref 65–99)
GLUCOSE BLD STRIP.AUTO-MCNC: 81 MG/DL (ref 65–99)
GLUCOSE BLD STRIP.AUTO-MCNC: 93 MG/DL (ref 65–99)
GLUCOSE BLD STRIP.AUTO-MCNC: 97 MG/DL (ref 65–99)
GLUCOSE SERPL-MCNC: 91 MG/DL (ref 65–99)
HCT VFR BLD AUTO: 35 % (ref 37–47)
HGB BLD-MCNC: 11.4 G/DL (ref 12–16)
HOLDING TUBE BB 8507: NORMAL
IMM GRANULOCYTES # BLD AUTO: 0.1 K/UL (ref 0–0.11)
IMM GRANULOCYTES NFR BLD AUTO: 0.9 % (ref 0–0.9)
LYMPHOCYTES # BLD AUTO: 2.08 K/UL (ref 1–4.8)
LYMPHOCYTES NFR BLD: 18 % (ref 22–41)
MCH RBC QN AUTO: 25.7 PG (ref 27–33)
MCHC RBC AUTO-ENTMCNC: 32.6 G/DL (ref 32.2–35.5)
MCV RBC AUTO: 79 FL (ref 81.4–97.8)
MONOCYTES # BLD AUTO: 0.68 K/UL (ref 0–0.85)
MONOCYTES NFR BLD AUTO: 5.9 % (ref 0–13.4)
NEUTROPHILS # BLD AUTO: 8.45 K/UL (ref 1.82–7.42)
NEUTROPHILS NFR BLD: 73 % (ref 44–72)
NRBC # BLD AUTO: 0 K/UL
NRBC BLD-RTO: 0 /100 WBC (ref 0–0.2)
PLATELET # BLD AUTO: 401 K/UL (ref 164–446)
PMV BLD AUTO: 9.4 FL (ref 9–12.9)
POTASSIUM SERPL-SCNC: 4 MMOL/L (ref 3.6–5.5)
PROT SERPL-MCNC: 7.5 G/DL (ref 6–8.2)
RBC # BLD AUTO: 4.43 M/UL (ref 4.2–5.4)
SODIUM SERPL-SCNC: 132 MMOL/L (ref 135–145)
T PALLIDUM AB SER QL IA: NORMAL
WBC # BLD AUTO: 11.6 K/UL (ref 4.8–10.8)

## 2024-10-22 PROCEDURE — 80053 COMPREHEN METABOLIC PANEL: CPT

## 2024-10-22 PROCEDURE — 36415 COLL VENOUS BLD VENIPUNCTURE: CPT

## 2024-10-22 PROCEDURE — 700101 HCHG RX REV CODE 250: Performed by: ANESTHESIOLOGY

## 2024-10-22 PROCEDURE — 700111 HCHG RX REV CODE 636 W/ 250 OVERRIDE (IP): Performed by: OBSTETRICS & GYNECOLOGY

## 2024-10-22 PROCEDURE — 700111 HCHG RX REV CODE 636 W/ 250 OVERRIDE (IP): Performed by: ANESTHESIOLOGY

## 2024-10-22 PROCEDURE — A9270 NON-COVERED ITEM OR SERVICE: HCPCS | Performed by: OBSTETRICS & GYNECOLOGY

## 2024-10-22 PROCEDURE — 304965 HCHG RECOVERY SERVICES

## 2024-10-22 PROCEDURE — 700105 HCHG RX REV CODE 258: Performed by: ANESTHESIOLOGY

## 2024-10-22 PROCEDURE — 0HQ9XZZ REPAIR PERINEUM SKIN, EXTERNAL APPROACH: ICD-10-PCS | Performed by: OBSTETRICS & GYNECOLOGY

## 2024-10-22 PROCEDURE — 700105 HCHG RX REV CODE 258: Performed by: OBSTETRICS & GYNECOLOGY

## 2024-10-22 PROCEDURE — 86780 TREPONEMA PALLIDUM: CPT

## 2024-10-22 PROCEDURE — 59409 OBSTETRICAL CARE: CPT

## 2024-10-22 PROCEDURE — 85025 COMPLETE CBC W/AUTO DIFF WBC: CPT

## 2024-10-22 PROCEDURE — 770002 HCHG ROOM/CARE - OB PRIVATE (112)

## 2024-10-22 PROCEDURE — 84112 EVAL AMNIOTIC FLUID PROTEIN: CPT

## 2024-10-22 PROCEDURE — 302449 STATCHG TRIAGE ONLY (STATISTIC)

## 2024-10-22 PROCEDURE — 303615 HCHG EPIDURAL/SPINAL ANESTHESIA FOR LABOR

## 2024-10-22 PROCEDURE — 700102 HCHG RX REV CODE 250 W/ 637 OVERRIDE(OP): Performed by: OBSTETRICS & GYNECOLOGY

## 2024-10-22 PROCEDURE — 82962 GLUCOSE BLOOD TEST: CPT | Mod: 91

## 2024-10-22 RX ORDER — ACETAMINOPHEN 500 MG
1000 TABLET ORAL
Status: COMPLETED | OUTPATIENT
Start: 2024-10-22 | End: 2024-10-22

## 2024-10-22 RX ORDER — OXYTOCIN 10 [USP'U]/ML
10 INJECTION, SOLUTION INTRAMUSCULAR; INTRAVENOUS
Status: COMPLETED | OUTPATIENT
Start: 2024-10-22 | End: 2024-10-22

## 2024-10-22 RX ORDER — ROPIVACAINE HYDROCHLORIDE 5 MG/ML
INJECTION, SOLUTION EPIDURAL; INFILTRATION; PERINEURAL PRN
Status: DISCONTINUED | OUTPATIENT
Start: 2024-10-22 | End: 2024-10-22 | Stop reason: SURG

## 2024-10-22 RX ORDER — SODIUM CHLORIDE 9 MG/ML
INJECTION, SOLUTION INTRAVENOUS CONTINUOUS
Status: DISCONTINUED | OUTPATIENT
Start: 2024-10-22 | End: 2024-10-22

## 2024-10-22 RX ORDER — TERBUTALINE SULFATE 1 MG/ML
0.25 INJECTION, SOLUTION SUBCUTANEOUS
Status: DISCONTINUED | OUTPATIENT
Start: 2024-10-22 | End: 2024-10-22 | Stop reason: HOSPADM

## 2024-10-22 RX ORDER — EPHEDRINE SULFATE 50 MG/ML
5 INJECTION, SOLUTION INTRAVENOUS
Status: DISCONTINUED | OUTPATIENT
Start: 2024-10-22 | End: 2024-10-22

## 2024-10-22 RX ORDER — DEXTROSE, SODIUM CHLORIDE, SODIUM LACTATE, POTASSIUM CHLORIDE, AND CALCIUM CHLORIDE 5; .6; .31; .03; .02 G/100ML; G/100ML; G/100ML; G/100ML; G/100ML
INJECTION, SOLUTION INTRAVENOUS CONTINUOUS
Status: DISCONTINUED | OUTPATIENT
Start: 2024-10-22 | End: 2024-10-24 | Stop reason: HOSPADM

## 2024-10-22 RX ORDER — MISOPROSTOL 200 UG/1
1000 TABLET ORAL PRN
Status: DISCONTINUED | OUTPATIENT
Start: 2024-10-22 | End: 2024-10-22 | Stop reason: HOSPADM

## 2024-10-22 RX ORDER — LIDOCAINE HYDROCHLORIDE 10 MG/ML
20 INJECTION, SOLUTION INFILTRATION; PERINEURAL
Status: DISCONTINUED | OUTPATIENT
Start: 2024-10-22 | End: 2024-10-22 | Stop reason: HOSPADM

## 2024-10-22 RX ORDER — IBUPROFEN 800 MG/1
800 TABLET, FILM COATED ORAL
Status: COMPLETED | OUTPATIENT
Start: 2024-10-22 | End: 2024-10-22

## 2024-10-22 RX ORDER — ONDANSETRON 4 MG/1
4 TABLET, ORALLY DISINTEGRATING ORAL EVERY 6 HOURS PRN
Status: DISCONTINUED | OUTPATIENT
Start: 2024-10-22 | End: 2024-10-22 | Stop reason: HOSPADM

## 2024-10-22 RX ORDER — METHYLERGONOVINE MALEATE 0.2 MG/ML
0.2 INJECTION INTRAVENOUS PRN
Status: DISCONTINUED | OUTPATIENT
Start: 2024-10-22 | End: 2024-10-22 | Stop reason: HOSPADM

## 2024-10-22 RX ORDER — SODIUM CHLORIDE 9 MG/ML
INJECTION, SOLUTION INTRAVENOUS CONTINUOUS
Status: DISCONTINUED | OUTPATIENT
Start: 2024-10-22 | End: 2024-10-24 | Stop reason: HOSPADM

## 2024-10-22 RX ORDER — SODIUM CHLORIDE, SODIUM LACTATE, POTASSIUM CHLORIDE, AND CALCIUM CHLORIDE .6; .31; .03; .02 G/100ML; G/100ML; G/100ML; G/100ML
1000 INJECTION, SOLUTION INTRAVENOUS ONCE
Status: COMPLETED | OUTPATIENT
Start: 2024-10-22 | End: 2024-10-22

## 2024-10-22 RX ORDER — EPHEDRINE SULFATE 50 MG/ML
5 INJECTION, SOLUTION INTRAVENOUS
Status: DISCONTINUED | OUTPATIENT
Start: 2024-10-22 | End: 2024-10-24 | Stop reason: HOSPADM

## 2024-10-22 RX ORDER — LIDOCAINE HYDROCHLORIDE AND EPINEPHRINE 15; 5 MG/ML; UG/ML
INJECTION, SOLUTION EPIDURAL
Status: COMPLETED | OUTPATIENT
Start: 2024-10-22 | End: 2024-10-22

## 2024-10-22 RX ORDER — ONDANSETRON 2 MG/ML
4 INJECTION INTRAMUSCULAR; INTRAVENOUS EVERY 6 HOURS PRN
Status: DISCONTINUED | OUTPATIENT
Start: 2024-10-22 | End: 2024-10-22 | Stop reason: HOSPADM

## 2024-10-22 RX ORDER — ROPIVACAINE HYDROCHLORIDE 2 MG/ML
INJECTION, SOLUTION EPIDURAL; INFILTRATION
Status: COMPLETED | OUTPATIENT
Start: 2024-10-22 | End: 2024-10-22

## 2024-10-22 RX ORDER — ROPIVACAINE HYDROCHLORIDE 2 MG/ML
INJECTION, SOLUTION EPIDURAL; INFILTRATION; PERINEURAL CONTINUOUS
Status: DISCONTINUED | OUTPATIENT
Start: 2024-10-22 | End: 2024-10-22

## 2024-10-22 RX ORDER — SODIUM CHLORIDE, SODIUM LACTATE, POTASSIUM CHLORIDE, CALCIUM CHLORIDE 600; 310; 30; 20 MG/100ML; MG/100ML; MG/100ML; MG/100ML
INJECTION, SOLUTION INTRAVENOUS CONTINUOUS
Status: DISCONTINUED | OUTPATIENT
Start: 2024-10-22 | End: 2024-10-24 | Stop reason: HOSPADM

## 2024-10-22 RX ORDER — ROPIVACAINE HYDROCHLORIDE 2 MG/ML
INJECTION, SOLUTION EPIDURAL; INFILTRATION; PERINEURAL CONTINUOUS
Status: DISCONTINUED | OUTPATIENT
Start: 2024-10-22 | End: 2024-10-24 | Stop reason: HOSPADM

## 2024-10-22 RX ADMIN — OXYTOCIN 10 UNITS: 10 INJECTION, SOLUTION INTRAMUSCULAR; INTRAVENOUS at 21:00

## 2024-10-22 RX ADMIN — SODIUM CHLORIDE, SODIUM LACTATE, POTASSIUM CHLORIDE, CALCIUM CHLORIDE AND DEXTROSE MONOHYDRATE: 5; 600; 310; 30; 20 INJECTION, SOLUTION INTRAVENOUS at 12:59

## 2024-10-22 RX ADMIN — IBUPROFEN 800 MG: 800 TABLET, FILM COATED ORAL at 22:28

## 2024-10-22 RX ADMIN — OXYTOCIN 10 UNITS: 10 INJECTION, SOLUTION INTRAMUSCULAR; INTRAVENOUS at 22:29

## 2024-10-22 RX ADMIN — ROPIVACAINE HYDROCHLORIDE 10 ML: 2 INJECTION EPIDURAL; INFILTRATION; PERINEURAL at 09:30

## 2024-10-22 RX ADMIN — OXYTOCIN 1 MILLI-UNITS/MIN: 10 INJECTION, SOLUTION INTRAMUSCULAR; INTRAVENOUS at 11:25

## 2024-10-22 RX ADMIN — ROPIVACAINE HYDROCHLORIDE 6 ML: 5 INJECTION EPIDURAL; INFILTRATION; PERINEURAL at 19:35

## 2024-10-22 RX ADMIN — ROPIVACAINE HYDROCHLORIDE: 2 INJECTION EPIDURAL; INFILTRATION; PERINEURAL at 09:32

## 2024-10-22 RX ADMIN — SODIUM CHLORIDE, POTASSIUM CHLORIDE, SODIUM LACTATE AND CALCIUM CHLORIDE: 600; 310; 30; 20 INJECTION, SOLUTION INTRAVENOUS at 09:35

## 2024-10-22 RX ADMIN — SODIUM CHLORIDE, POTASSIUM CHLORIDE, SODIUM LACTATE AND CALCIUM CHLORIDE 1000 ML: 600; 310; 30; 20 INJECTION, SOLUTION INTRAVENOUS at 08:27

## 2024-10-22 RX ADMIN — ROPIVACAINE HYDROCHLORIDE 8 ML: 5 INJECTION EPIDURAL; INFILTRATION; PERINEURAL at 17:53

## 2024-10-22 RX ADMIN — ACETAMINOPHEN 1000 MG: 500 TABLET ORAL at 22:28

## 2024-10-22 RX ADMIN — ROPIVACAINE HYDROCHLORIDE: 2 INJECTION EPIDURAL; INFILTRATION; PERINEURAL at 15:37

## 2024-10-22 RX ADMIN — LIDOCAINE HYDROCHLORIDE,EPINEPHRINE BITARTRATE 3 ML: 15; .005 INJECTION, SOLUTION EPIDURAL; INFILTRATION; INTRACAUDAL; PERINEURAL at 09:30

## 2024-10-22 RX ADMIN — ROPIVACAINE HYDROCHLORIDE 5 ML: 5 INJECTION EPIDURAL; INFILTRATION; PERINEURAL at 17:29

## 2024-10-22 SDOH — ECONOMIC STABILITY: TRANSPORTATION INSECURITY
IN THE PAST 12 MONTHS, HAS THE LACK OF TRANSPORTATION KEPT YOU FROM MEDICAL APPOINTMENTS OR FROM GETTING MEDICATIONS?: NO

## 2024-10-22 SDOH — ECONOMIC STABILITY: TRANSPORTATION INSECURITY
IN THE PAST 12 MONTHS, HAS LACK OF RELIABLE TRANSPORTATION KEPT YOU FROM MEDICAL APPOINTMENTS, MEETINGS, WORK OR FROM GETTING THINGS NEEDED FOR DAILY LIVING?: NO

## 2024-10-22 ASSESSMENT — LIFESTYLE VARIABLES
TOTAL SCORE: 0
HAVE YOU EVER FELT YOU SHOULD CUT DOWN ON YOUR DRINKING: NO
EVER HAD A DRINK FIRST THING IN THE MORNING TO STEADY YOUR NERVES TO GET RID OF A HANGOVER: NO
TOTAL SCORE: 0
ON A TYPICAL DAY WHEN YOU DRINK ALCOHOL HOW MANY DRINKS DO YOU HAVE: 0
HOW MANY TIMES IN THE PAST YEAR HAVE YOU HAD 5 OR MORE DRINKS IN A DAY: 0
TOTAL SCORE: 0
AVERAGE NUMBER OF DAYS PER WEEK YOU HAVE A DRINK CONTAINING ALCOHOL: 0
EVER FELT BAD OR GUILTY ABOUT YOUR DRINKING: NO
HAVE PEOPLE ANNOYED YOU BY CRITICIZING YOUR DRINKING: NO
DOES PATIENT WANT TO STOP DRINKING: NO
CONSUMPTION TOTAL: NEGATIVE
ALCOHOL_USE: NO

## 2024-10-22 ASSESSMENT — SOCIAL DETERMINANTS OF HEALTH (SDOH)
WITHIN THE LAST YEAR, HAVE YOU BEEN HUMILIATED OR EMOTIONALLY ABUSED IN OTHER WAYS BY YOUR PARTNER OR EX-PARTNER?: NO
WITHIN THE LAST YEAR, HAVE YOU BEEN AFRAID OF YOUR PARTNER OR EX-PARTNER?: NO
WITHIN THE LAST YEAR, HAVE YOU BEEN KICKED, HIT, SLAPPED, OR OTHERWISE PHYSICALLY HURT BY YOUR PARTNER OR EX-PARTNER?: NO
WITHIN THE LAST YEAR, HAVE TO BEEN RAPED OR FORCED TO HAVE ANY KIND OF SEXUAL ACTIVITY BY YOUR PARTNER OR EX-PARTNER?: NO
IN THE PAST 12 MONTHS, HAS THE ELECTRIC, GAS, OIL, OR WATER COMPANY THREATENED TO SHUT OFF SERVICE IN YOUR HOME?: NO
WITHIN THE PAST 12 MONTHS, YOU WORRIED THAT YOUR FOOD WOULD RUN OUT BEFORE YOU GOT THE MONEY TO BUY MORE: NEVER TRUE

## 2024-10-22 ASSESSMENT — PATIENT HEALTH QUESTIONNAIRE - PHQ9
1. LITTLE INTEREST OR PLEASURE IN DOING THINGS: NOT AT ALL
SUM OF ALL RESPONSES TO PHQ9 QUESTIONS 1 AND 2: 0
2. FEELING DOWN, DEPRESSED, IRRITABLE, OR HOPELESS: NOT AT ALL

## 2024-10-22 ASSESSMENT — PAIN DESCRIPTION - PAIN TYPE
TYPE: ACUTE PAIN

## 2024-10-23 PROCEDURE — 700102 HCHG RX REV CODE 250 W/ 637 OVERRIDE(OP): Performed by: OBSTETRICS & GYNECOLOGY

## 2024-10-23 PROCEDURE — A9270 NON-COVERED ITEM OR SERVICE: HCPCS | Performed by: OBSTETRICS & GYNECOLOGY

## 2024-10-23 PROCEDURE — 770002 HCHG ROOM/CARE - OB PRIVATE (112)

## 2024-10-23 RX ORDER — VITAMIN A ACETATE, BETA CAROTENE, ASCORBIC ACID, CHOLECALCIFEROL, .ALPHA.-TOCOPHEROL ACETATE, DL-, THIAMINE MONONITRATE, RIBOFLAVIN, NIACINAMIDE, PYRIDOXINE HYDROCHLORIDE, FOLIC ACID, CYANOCOBALAMIN, CALCIUM CARBONATE, FERROUS FUMARATE, ZINC OXIDE, CUPRIC OXIDE 3080; 12; 120; 400; 1; 1.84; 3; 20; 22; 920; 25; 200; 27; 10; 2 [IU]/1; UG/1; MG/1; [IU]/1; MG/1; MG/1; MG/1; MG/1; MG/1; [IU]/1; MG/1; MG/1; MG/1; MG/1; MG/1
1 TABLET, FILM COATED ORAL
Status: DISCONTINUED | OUTPATIENT
Start: 2024-10-23 | End: 2024-10-24 | Stop reason: HOSPADM

## 2024-10-23 RX ORDER — METHYLERGONOVINE MALEATE 0.2 MG/ML
0.2 INJECTION INTRAVENOUS
Status: DISCONTINUED | OUTPATIENT
Start: 2024-10-23 | End: 2024-10-24 | Stop reason: HOSPADM

## 2024-10-23 RX ORDER — MISOPROSTOL 200 UG/1
600 TABLET ORAL
Status: DISCONTINUED | OUTPATIENT
Start: 2024-10-23 | End: 2024-10-24 | Stop reason: HOSPADM

## 2024-10-23 RX ORDER — CARBOPROST TROMETHAMINE 250 UG/ML
250 INJECTION, SOLUTION INTRAMUSCULAR
Status: DISCONTINUED | OUTPATIENT
Start: 2024-10-23 | End: 2024-10-24 | Stop reason: HOSPADM

## 2024-10-23 RX ORDER — IBUPROFEN 800 MG/1
800 TABLET, FILM COATED ORAL EVERY 8 HOURS PRN
Status: DISCONTINUED | OUTPATIENT
Start: 2024-10-23 | End: 2024-10-24 | Stop reason: HOSPADM

## 2024-10-23 RX ORDER — DOCUSATE SODIUM 100 MG/1
100 CAPSULE, LIQUID FILLED ORAL 2 TIMES DAILY PRN
Status: DISCONTINUED | OUTPATIENT
Start: 2024-10-23 | End: 2024-10-24 | Stop reason: HOSPADM

## 2024-10-23 RX ORDER — SIMETHICONE 125 MG
125 TABLET,CHEWABLE ORAL 4 TIMES DAILY PRN
Status: DISCONTINUED | OUTPATIENT
Start: 2024-10-23 | End: 2024-10-24 | Stop reason: HOSPADM

## 2024-10-23 RX ORDER — ACETAMINOPHEN 500 MG
1000 TABLET ORAL EVERY 6 HOURS PRN
Status: DISCONTINUED | OUTPATIENT
Start: 2024-10-23 | End: 2024-10-24 | Stop reason: HOSPADM

## 2024-10-23 RX ADMIN — PRENATAL WITH FERROUS FUM AND FOLIC ACID 1 TABLET: 3080; 920; 120; 400; 22; 1.84; 3; 20; 10; 1; 12; 200; 27; 25; 2 TABLET ORAL at 07:57

## 2024-10-23 RX ADMIN — IBUPROFEN 800 MG: 800 TABLET, FILM COATED ORAL at 19:50

## 2024-10-23 RX ADMIN — Medication 1 TABLET: at 07:57

## 2024-10-23 RX ADMIN — ACETAMINOPHEN 1000 MG: 500 TABLET ORAL at 16:01

## 2024-10-23 RX ADMIN — ACETAMINOPHEN 1000 MG: 500 TABLET ORAL at 05:52

## 2024-10-23 RX ADMIN — IBUPROFEN 800 MG: 800 TABLET, FILM COATED ORAL at 07:59

## 2024-10-23 ASSESSMENT — PAIN DESCRIPTION - PAIN TYPE
TYPE: ACUTE PAIN

## 2024-10-23 ASSESSMENT — EDINBURGH POSTNATAL DEPRESSION SCALE (EPDS)
I HAVE FELT SAD OR MISERABLE: NO, NOT AT ALL
I HAVE BEEN ABLE TO LAUGH AND SEE THE FUNNY SIDE OF THINGS: AS MUCH AS I ALWAYS COULD
I HAVE BEEN ANXIOUS OR WORRIED FOR NO GOOD REASON: NO, NOT AT ALL
I HAVE BEEN SO UNHAPPY THAT I HAVE HAD DIFFICULTY SLEEPING: NOT AT ALL
I HAVE FELT SCARED OR PANICKY FOR NO GOOD REASON: NO, NOT MUCH
THINGS HAVE BEEN GETTING ON TOP OF ME: NO, I HAVE BEEN COPING AS WELL AS EVER
I HAVE BEEN SO UNHAPPY THAT I HAVE BEEN CRYING: NO, NEVER
I HAVE LOOKED FORWARD WITH ENJOYMENT TO THINGS: AS MUCH AS I EVER DID
I HAVE BLAMED MYSELF UNNECESSARILY WHEN THINGS WENT WRONG: NO, NEVER
THE THOUGHT OF HARMING MYSELF HAS OCCURRED TO ME: NEVER

## 2024-10-23 ASSESSMENT — PAIN SCALES - GENERAL: PAIN_LEVEL: 0

## 2024-10-24 ENCOUNTER — LACTATION ENCOUNTER (OUTPATIENT)
Dept: NURSERY | Facility: MEDICAL CENTER | Age: 29
End: 2024-10-24

## 2024-10-24 ENCOUNTER — PHARMACY VISIT (OUTPATIENT)
Dept: PHARMACY | Facility: MEDICAL CENTER | Age: 29
End: 2024-10-24
Payer: COMMERCIAL

## 2024-10-24 VITALS
SYSTOLIC BLOOD PRESSURE: 116 MMHG | RESPIRATION RATE: 16 BRPM | TEMPERATURE: 97.3 F | WEIGHT: 260 LBS | HEIGHT: 67 IN | OXYGEN SATURATION: 95 % | HEART RATE: 81 BPM | BODY MASS INDEX: 40.81 KG/M2 | DIASTOLIC BLOOD PRESSURE: 75 MMHG

## 2024-10-24 PROBLEM — D62 ANEMIA ASSOCIATED WITH ACUTE BLOOD LOSS: Status: ACTIVE | Noted: 2024-10-24

## 2024-10-24 PROBLEM — O24.410 GDM (GESTATIONAL DIABETES MELLITUS), CLASS A1: Status: ACTIVE | Noted: 2024-10-24

## 2024-10-24 LAB
ERYTHROCYTE [DISTWIDTH] IN BLOOD BY AUTOMATED COUNT: 40.6 FL (ref 35.9–50)
HCT VFR BLD AUTO: 29 % (ref 37–47)
HGB BLD-MCNC: 9.2 G/DL (ref 12–16)
MCH RBC QN AUTO: 25.3 PG (ref 27–33)
MCHC RBC AUTO-ENTMCNC: 31.7 G/DL (ref 32.2–35.5)
MCV RBC AUTO: 79.7 FL (ref 81.4–97.8)
PLATELET # BLD AUTO: 308 K/UL (ref 164–446)
PMV BLD AUTO: 9.2 FL (ref 9–12.9)
RBC # BLD AUTO: 3.64 M/UL (ref 4.2–5.4)
WBC # BLD AUTO: 15.8 K/UL (ref 4.8–10.8)

## 2024-10-24 PROCEDURE — RXMED WILLOW AMBULATORY MEDICATION CHARGE: Performed by: OBSTETRICS & GYNECOLOGY

## 2024-10-24 PROCEDURE — A9270 NON-COVERED ITEM OR SERVICE: HCPCS | Performed by: OBSTETRICS & GYNECOLOGY

## 2024-10-24 PROCEDURE — 85027 COMPLETE CBC AUTOMATED: CPT

## 2024-10-24 PROCEDURE — 700102 HCHG RX REV CODE 250 W/ 637 OVERRIDE(OP): Performed by: OBSTETRICS & GYNECOLOGY

## 2024-10-24 PROCEDURE — 36415 COLL VENOUS BLD VENIPUNCTURE: CPT

## 2024-10-24 RX ORDER — VITAMIN A ACETATE, BETA CAROTENE, ASCORBIC ACID, CHOLECALCIFEROL, .ALPHA.-TOCOPHEROL ACETATE, DL-, THIAMINE MONONITRATE, RIBOFLAVIN, NIACINAMIDE, PYRIDOXINE HYDROCHLORIDE, FOLIC ACID, CYANOCOBALAMIN, CALCIUM CARBONATE, FERROUS FUMARATE, ZINC OXIDE, CUPRIC OXIDE 3080; 12; 120; 400; 1; 1.84; 3; 20; 22; 920; 25; 200; 27; 10; 2 [IU]/1; UG/1; MG/1; [IU]/1; MG/1; MG/1; MG/1; MG/1; MG/1; [IU]/1; MG/1; MG/1; MG/1; MG/1; MG/1
1 TABLET, FILM COATED ORAL DAILY
COMMUNITY
Start: 2024-10-24

## 2024-10-24 RX ORDER — ACETAMINOPHEN 500 MG
500-1000 TABLET ORAL EVERY 6 HOURS PRN
COMMUNITY
Start: 2024-10-24

## 2024-10-24 RX ADMIN — Medication 1 TABLET: at 09:16

## 2024-10-24 RX ADMIN — ACETAMINOPHEN 1000 MG: 500 TABLET ORAL at 02:21

## 2024-10-24 RX ADMIN — PRENATAL WITH FERROUS FUM AND FOLIC ACID 1 TABLET: 3080; 920; 120; 400; 22; 1.84; 3; 20; 10; 1; 12; 200; 27; 25; 2 TABLET ORAL at 09:16

## 2024-10-24 ASSESSMENT — PATIENT HEALTH QUESTIONNAIRE - PHQ9
SUM OF ALL RESPONSES TO PHQ9 QUESTIONS 1 AND 2: 0
1. LITTLE INTEREST OR PLEASURE IN DOING THINGS: NOT AT ALL
2. FEELING DOWN, DEPRESSED, IRRITABLE, OR HOPELESS: NOT AT ALL